# Patient Record
Sex: MALE | Race: WHITE | NOT HISPANIC OR LATINO | Employment: FULL TIME | URBAN - METROPOLITAN AREA
[De-identification: names, ages, dates, MRNs, and addresses within clinical notes are randomized per-mention and may not be internally consistent; named-entity substitution may affect disease eponyms.]

---

## 2017-07-10 ENCOUNTER — APPOINTMENT (OUTPATIENT)
Dept: URGENT CARE | Age: 47
End: 2017-07-10
Payer: OTHER MISCELLANEOUS

## 2017-07-10 ENCOUNTER — APPOINTMENT (OUTPATIENT)
Dept: RADIOLOGY | Age: 47
End: 2017-07-10
Payer: OTHER MISCELLANEOUS

## 2017-07-10 ENCOUNTER — TRANSCRIBE ORDERS (OUTPATIENT)
Dept: URGENT CARE | Age: 47
End: 2017-07-10

## 2017-07-10 DIAGNOSIS — T14.90XA INJURY: Primary | ICD-10-CM

## 2017-07-10 DIAGNOSIS — T14.90XA INJURY: ICD-10-CM

## 2017-07-10 PROCEDURE — 99284 EMERGENCY DEPT VISIT MOD MDM: CPT | Performed by: PREVENTIVE MEDICINE

## 2017-07-10 PROCEDURE — G0383 LEV 4 HOSP TYPE B ED VISIT: HCPCS | Performed by: PREVENTIVE MEDICINE

## 2017-07-10 PROCEDURE — 73030 X-RAY EXAM OF SHOULDER: CPT

## 2017-07-31 ENCOUNTER — APPOINTMENT (OUTPATIENT)
Dept: URGENT CARE | Age: 47
End: 2017-07-31
Payer: OTHER MISCELLANEOUS

## 2017-07-31 PROCEDURE — 99213 OFFICE O/P EST LOW 20 MIN: CPT | Performed by: PREVENTIVE MEDICINE

## 2018-04-18 NOTE — PRE-PROCEDURE INSTRUCTIONS
Pre-Surgery Instructions:   Medication Instructions    aspirin 81 MG tablet Instructed patient per Anesthesia Guidelines   escitalopram (LEXAPRO) 10 mg tablet Instructed patient per Anesthesia Guidelines   olmesartan (BENICAR) 40 mg tablet Instructed patient per Anesthesia Guidelines   pioglitazone (ACTOS) 15 mg tablet Instructed patient per Anesthesia Guidelines   rosuvastatin (CRESTOR) 10 MG tablet Instructed patient per Anesthesia Guidelines   sitaGLIPtin-metFORMIN (JANUMET)  MG per tablet Instructed patient per Anesthesia Guidelines     Pre op instructions reviewed; verbalized understanding

## 2018-04-25 ENCOUNTER — ANESTHESIA EVENT (OUTPATIENT)
Dept: PERIOP | Facility: HOSPITAL | Age: 48
End: 2018-04-25
Payer: COMMERCIAL

## 2018-04-25 ENCOUNTER — HOSPITAL ENCOUNTER (OUTPATIENT)
Facility: HOSPITAL | Age: 48
Setting detail: OUTPATIENT SURGERY
Discharge: HOME | End: 2018-04-25
Attending: COLON & RECTAL SURGERY | Admitting: COLON & RECTAL SURGERY
Payer: COMMERCIAL

## 2018-04-25 ENCOUNTER — ANESTHESIA (OUTPATIENT)
Dept: PERIOP | Facility: HOSPITAL | Age: 48
End: 2018-04-25
Payer: COMMERCIAL

## 2018-04-25 VITALS
BODY MASS INDEX: 36.32 KG/M2 | WEIGHT: 283 LBS | DIASTOLIC BLOOD PRESSURE: 70 MMHG | SYSTOLIC BLOOD PRESSURE: 135 MMHG | HEART RATE: 96 BPM | RESPIRATION RATE: 16 BRPM | HEIGHT: 74 IN | TEMPERATURE: 99.6 F | OXYGEN SATURATION: 96 %

## 2018-04-25 DIAGNOSIS — L05.01 PILONIDAL CYST WITH ABSCESS: Primary | ICD-10-CM

## 2018-04-25 LAB
GLUCOSE SERPL-MCNC: 161 MG/DL (ref 65–140)
GLUCOSE SERPL-MCNC: 162 MG/DL (ref 65–140)

## 2018-04-25 PROCEDURE — 82948 REAGENT STRIP/BLOOD GLUCOSE: CPT

## 2018-04-25 RX ORDER — ONDANSETRON 2 MG/ML
4 INJECTION INTRAMUSCULAR; INTRAVENOUS ONCE AS NEEDED
Status: DISCONTINUED | OUTPATIENT
Start: 2018-04-25 | End: 2018-04-25 | Stop reason: HOSPADM

## 2018-04-25 RX ORDER — ALBUTEROL SULFATE 2.5 MG/3ML
2.5 SOLUTION RESPIRATORY (INHALATION) ONCE AS NEEDED
Status: DISCONTINUED | OUTPATIENT
Start: 2018-04-25 | End: 2018-04-25 | Stop reason: HOSPADM

## 2018-04-25 RX ORDER — FENTANYL CITRATE/PF 50 MCG/ML
50 SYRINGE (ML) INJECTION
Status: DISCONTINUED | OUTPATIENT
Start: 2018-04-25 | End: 2018-04-25 | Stop reason: HOSPADM

## 2018-04-25 RX ORDER — METOCLOPRAMIDE HYDROCHLORIDE 5 MG/ML
10 INJECTION INTRAMUSCULAR; INTRAVENOUS ONCE AS NEEDED
Status: DISCONTINUED | OUTPATIENT
Start: 2018-04-25 | End: 2018-04-25 | Stop reason: HOSPADM

## 2018-04-25 RX ORDER — OXYCODONE HYDROCHLORIDE AND ACETAMINOPHEN 5; 325 MG/1; MG/1
1 TABLET ORAL EVERY 4 HOURS PRN
Qty: 30 TABLET | Refills: 0 | Status: SHIPPED | OUTPATIENT
Start: 2018-04-25 | End: 2018-05-05

## 2018-04-25 RX ORDER — SUCCINYLCHOLINE CHLORIDE 20 MG/ML
INJECTION INTRAMUSCULAR; INTRAVENOUS AS NEEDED
Status: DISCONTINUED | OUTPATIENT
Start: 2018-04-25 | End: 2018-04-25 | Stop reason: SURG

## 2018-04-25 RX ORDER — FENTANYL CITRATE 50 UG/ML
INJECTION, SOLUTION INTRAMUSCULAR; INTRAVENOUS AS NEEDED
Status: DISCONTINUED | OUTPATIENT
Start: 2018-04-25 | End: 2018-04-25 | Stop reason: SURG

## 2018-04-25 RX ORDER — SODIUM CHLORIDE, SODIUM LACTATE, POTASSIUM CHLORIDE, CALCIUM CHLORIDE 600; 310; 30; 20 MG/100ML; MG/100ML; MG/100ML; MG/100ML
125 INJECTION, SOLUTION INTRAVENOUS CONTINUOUS
Status: DISCONTINUED | OUTPATIENT
Start: 2018-04-25 | End: 2018-04-25 | Stop reason: HOSPADM

## 2018-04-25 RX ORDER — KETAMINE HYDROCHLORIDE 50 MG/ML
INJECTION, SOLUTION, CONCENTRATE INTRAMUSCULAR; INTRAVENOUS AS NEEDED
Status: DISCONTINUED | OUTPATIENT
Start: 2018-04-25 | End: 2018-04-25 | Stop reason: SURG

## 2018-04-25 RX ORDER — OXYCODONE HYDROCHLORIDE AND ACETAMINOPHEN 5; 325 MG/1; MG/1
1 TABLET ORAL EVERY 4 HOURS PRN
Status: DISCONTINUED | OUTPATIENT
Start: 2018-04-25 | End: 2018-04-25 | Stop reason: HOSPADM

## 2018-04-25 RX ORDER — SODIUM CHLORIDE, SODIUM LACTATE, POTASSIUM CHLORIDE, CALCIUM CHLORIDE 600; 310; 30; 20 MG/100ML; MG/100ML; MG/100ML; MG/100ML
50 INJECTION, SOLUTION INTRAVENOUS CONTINUOUS
Status: DISCONTINUED | OUTPATIENT
Start: 2018-04-25 | End: 2018-04-25 | Stop reason: HOSPADM

## 2018-04-25 RX ORDER — BUPIVACAINE HYDROCHLORIDE AND EPINEPHRINE 2.5; 5 MG/ML; UG/ML
INJECTION, SOLUTION EPIDURAL; INFILTRATION; INTRACAUDAL; PERINEURAL AS NEEDED
Status: DISCONTINUED | OUTPATIENT
Start: 2018-04-25 | End: 2018-04-25 | Stop reason: HOSPADM

## 2018-04-25 RX ORDER — GLYCOPYRROLATE 0.2 MG/ML
INJECTION INTRAMUSCULAR; INTRAVENOUS AS NEEDED
Status: DISCONTINUED | OUTPATIENT
Start: 2018-04-25 | End: 2018-04-25 | Stop reason: SURG

## 2018-04-25 RX ORDER — PROMETHAZINE HYDROCHLORIDE 25 MG/ML
12.5 INJECTION, SOLUTION INTRAMUSCULAR; INTRAVENOUS ONCE AS NEEDED
Status: DISCONTINUED | OUTPATIENT
Start: 2018-04-25 | End: 2018-04-25 | Stop reason: HOSPADM

## 2018-04-25 RX ORDER — ONDANSETRON 2 MG/ML
INJECTION INTRAMUSCULAR; INTRAVENOUS AS NEEDED
Status: DISCONTINUED | OUTPATIENT
Start: 2018-04-25 | End: 2018-04-25 | Stop reason: SURG

## 2018-04-25 RX ORDER — PROPOFOL 10 MG/ML
INJECTION, EMULSION INTRAVENOUS AS NEEDED
Status: DISCONTINUED | OUTPATIENT
Start: 2018-04-25 | End: 2018-04-25 | Stop reason: SURG

## 2018-04-25 RX ORDER — LIDOCAINE HYDROCHLORIDE 10 MG/ML
INJECTION, SOLUTION INFILTRATION; PERINEURAL AS NEEDED
Status: DISCONTINUED | OUTPATIENT
Start: 2018-04-25 | End: 2018-04-25 | Stop reason: SURG

## 2018-04-25 RX ORDER — ROCURONIUM BROMIDE 10 MG/ML
INJECTION, SOLUTION INTRAVENOUS AS NEEDED
Status: DISCONTINUED | OUTPATIENT
Start: 2018-04-25 | End: 2018-04-25 | Stop reason: SURG

## 2018-04-25 RX ORDER — MIDAZOLAM HYDROCHLORIDE 1 MG/ML
INJECTION INTRAMUSCULAR; INTRAVENOUS AS NEEDED
Status: DISCONTINUED | OUTPATIENT
Start: 2018-04-25 | End: 2018-04-25 | Stop reason: SURG

## 2018-04-25 RX ADMIN — KETAMINE HYDROCHLORIDE 50 MG: 50 INJECTION, SOLUTION INTRAMUSCULAR; INTRAVENOUS at 10:25

## 2018-04-25 RX ADMIN — MIDAZOLAM HYDROCHLORIDE 2 MG: 1 INJECTION, SOLUTION INTRAMUSCULAR; INTRAVENOUS at 10:03

## 2018-04-25 RX ADMIN — ONDANSETRON 4 MG: 2 INJECTION INTRAMUSCULAR; INTRAVENOUS at 10:27

## 2018-04-25 RX ADMIN — NEOSTIGMINE METHYLSULFATE 3 MG: 1 INJECTION, SOLUTION INTRAMUSCULAR; INTRAVENOUS; SUBCUTANEOUS at 10:45

## 2018-04-25 RX ADMIN — PROPOFOL 200 MG: 10 INJECTION, EMULSION INTRAVENOUS at 10:11

## 2018-04-25 RX ADMIN — SUCCINYLCHOLINE CHLORIDE 100 MG: 20 INJECTION, SOLUTION INTRAMUSCULAR; INTRAVENOUS at 10:11

## 2018-04-25 RX ADMIN — FENTANYL CITRATE 50 MCG: 50 INJECTION, SOLUTION INTRAMUSCULAR; INTRAVENOUS at 11:09

## 2018-04-25 RX ADMIN — OXYCODONE HYDROCHLORIDE AND ACETAMINOPHEN 1 TABLET: 5; 325 TABLET ORAL at 12:02

## 2018-04-25 RX ADMIN — ROCURONIUM BROMIDE 30 MG: 10 INJECTION INTRAVENOUS at 10:18

## 2018-04-25 RX ADMIN — SODIUM CHLORIDE, SODIUM LACTATE, POTASSIUM CHLORIDE, AND CALCIUM CHLORIDE 125 ML/HR: .6; .31; .03; .02 INJECTION, SOLUTION INTRAVENOUS at 09:23

## 2018-04-25 RX ADMIN — FENTANYL CITRATE 50 MCG: 50 INJECTION, SOLUTION INTRAMUSCULAR; INTRAVENOUS at 11:20

## 2018-04-25 RX ADMIN — FENTANYL CITRATE 100 MCG: 50 INJECTION, SOLUTION INTRAMUSCULAR; INTRAVENOUS at 10:07

## 2018-04-25 RX ADMIN — LIDOCAINE HYDROCHLORIDE 50 MG: 10 INJECTION, SOLUTION INFILTRATION; PERINEURAL at 10:11

## 2018-04-25 RX ADMIN — DEXAMETHASONE SODIUM PHOSPHATE 5 MG: 10 INJECTION INTRAMUSCULAR; INTRAVENOUS at 10:27

## 2018-04-25 RX ADMIN — GLYCOPYRROLATE 0.4 MG: 0.2 INJECTION, SOLUTION INTRAMUSCULAR; INTRAVENOUS at 10:45

## 2018-04-25 NOTE — ANESTHESIA POSTPROCEDURE EVALUATION
Post-Op Assessment Note      CV Status:  Stable    Mental Status:  Alert and awake    Hydration Status:  Euvolemic    PONV Controlled:  Controlled    Airway Patency:  Patent    Post Op Vitals Reviewed: Yes          Staff: CRNA, Anesthesiologist           BP   188/84   Temp   98 7   Pulse  97   Resp   20   SpO2   96

## 2018-04-25 NOTE — OP NOTE
OPERATIVE REPORT  PATIENT NAME: Dunia Alvarez    :  1970  MRN: [id number]  Pt Location: [facility] OR ROOM 15    SURGERY DATE: 2018    Surgeon(s) and Role:     * Lissy Yañez MD - Primary    Preop Diagnosis:  Pilonidal cyst with abscess [L05 01]    Post-Op Diagnosis Codes:     * Pilonidal cyst with abscess [L05 01]    Procedure(s) (LRB):  UNROOFING PILONIDAL DISEASE (N/A)    Specimen(s):  * No specimens in log *    Estimated Blood Loss:   Minimal    Drains:   None    Anesthesia Type:   General    Operative Indications:  Pilonidal cyst with abscess [L05 01]      Operative Findings:      Complications:   None    Procedure and Technique:  After preop identification the preoperative holding area, the patient was taken to the operating room placed in the supine position  Following induction of satisfactory general endotracheal anesthesia, the patient was placed in the prone position  Buttocks were taped apart  Patient was prepped and draped in usual sterile fashion  Time-out was undertaken procedure begun  On the left side of the midline patient's previously drained abscess was noted  Lacrimal duct probe was inserted through this  This was noted tract minimally towards the anus with more towards the lower back  This tract was opened along its length  This was attached to 3 midline pits  These were opened along their entire length  The area was then inspected  All inflamed tissue was excised  There was no connection of this cavity to the anorectum  Hemostasis was then ensured with Bovie cautery  Edges were then marsupialized using 3 0 chromic  Local field block was then obtained with 30 cc of 0 25% bupivacaine with epinephrine  Dry sterile dressing was applied      I was present for the entire procedure    Patient Disposition:  PACU     SIGNATURE: Lissy Yañez MD  DATE: 2018  TIME: 10:51 AM

## 2018-04-25 NOTE — ANESTHESIA PREPROCEDURE EVALUATION
Review of Systems/Medical History  Patient summary reviewed  Chart reviewed  No history of anesthetic complications     Cardiovascular  EKG reviewed, Hypertension ,   Comment: Normal sinus rhythm  Normal ECG  When compared with ECG of 17-MAY-2008 23:02,  No significant change was found  Confirmed by [facility] MD, Shaw Tran ([id number]) on 5/14/2015 8:20:58 AM    Specimen Collected: 05/14/15 07:19  Last Resulted: 05/14/15 08:18      ,  Pulmonary       GI/Hepatic            Endo/Other  Diabetes poorly controlled type 2 ,      GYN       Hematology   Musculoskeletal       Neurology   Psychology           Physical Exam    Airway    Mallampati score: II  TM Distance: >3 FB  Neck ROM: full     Dental   No notable dental hx     Cardiovascular  Cardiovascular exam normal    Pulmonary  Pulmonary exam normal Breath sounds clear to auscultation,     Other Findings        Anesthesia Plan  ASA Score- 2     Anesthesia Type- general with ASA Monitors  Additional Monitors:   Airway Plan: ETT  Plan Factors- Patient instructed to abstain from smoking on day of procedure       Induction- intravenous  Postoperative Plan- Plan for postoperative opioid use  Planned trial extubation    Informed Consent- Anesthetic plan and risks discussed with patient  I personally reviewed this patient with the CRNA  Discussed and agreed on the Anesthesia Plan with the CRNA           Lab Results   Component Value Date    WBC 12 49 (H) 05/14/2015    HGB 16 2 05/14/2015    HCT 46 8 05/14/2015    MCV 89 05/14/2015     05/14/2015     Lab Results   Component Value Date    GLUCOSE 276 (H) 05/14/2015    CALCIUM 9 1 05/14/2015     (L) 05/14/2015    K 4 3 05/14/2015    CO2 28 05/14/2015    CL 97 (L) 05/14/2015    BUN 11 05/14/2015    CREATININE 0 79 05/14/2015     Lab Results   Component Value Date    INR 0 88 05/14/2015    PROTIME 11 8 05/14/2015     Lab Results   Component Value Date    PTT 31 05/14/2015           I, Dr Alivia Barney, the attending physician, have personally seen and evaluated the patient prior to anesthetic care  I have reviewed the pre-anesthetic record, and other medical records if appropriate to the anesthetic care  If a CRNA is involved in the case, I have reviewed the CRNA assessment, if present, and agree  The patient is in a suitable condition to proceed with my formulated anesthetic plan

## 2018-04-25 NOTE — DISCHARGE INSTRUCTIONS
May shower or tub bathe beginning today  Keep wound covered with gauze and change as needed  Ensure you are not getting constipated using Metamucil 1 tablespoon 1-2 times daily with plenty of hydration  Pain medication as prescribed for pain not to be combined with Tylenol  May use ibuprofen as well     Call for worsening pain, heavy bleeding, inability to urinate or fever greater than 101 5  2 weeks follow-up Dr [facility] [phone]

## 2018-04-25 NOTE — ANESTHESIA POSTPROCEDURE EVALUATION
Post-Op Assessment Note      CV Status:  Stable    Mental Status:  Alert and awake    Hydration Status:  Euvolemic    PONV Controlled:  Controlled    Airway Patency:  Patent    Post Op Vitals Reviewed: Yes          Staff: Anesthesiologist, CRNA           BP   166/84   Temp   98 7   Pulse  103   Resp   20   SpO2   97%

## 2019-04-03 ENCOUNTER — HOSPITAL ENCOUNTER (EMERGENCY)
Facility: HOSPITAL | Age: 49
Discharge: HOME | End: 2019-04-03
Attending: EMERGENCY MEDICINE | Admitting: EMERGENCY MEDICINE
Payer: COMMERCIAL

## 2019-04-03 ENCOUNTER — APPOINTMENT (EMERGENCY)
Dept: RADIOLOGY | Facility: HOSPITAL | Age: 49
End: 2019-04-03
Payer: COMMERCIAL

## 2019-04-03 VITALS
HEART RATE: 86 BPM | WEIGHT: 280 LBS | TEMPERATURE: 97.9 F | RESPIRATION RATE: 16 BRPM | HEIGHT: 74 IN | SYSTOLIC BLOOD PRESSURE: 167 MMHG | DIASTOLIC BLOOD PRESSURE: 85 MMHG | OXYGEN SATURATION: 98 % | BODY MASS INDEX: 35.94 KG/M2
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
## 2019-04-03 PROCEDURE — 73030 X-RAY EXAM OF SHOULDER: CPT

## 2019-04-03 PROCEDURE — 73560 X-RAY EXAM OF KNEE 1 OR 2: CPT

## 2019-04-03 PROCEDURE — 99284 EMERGENCY DEPT VISIT MOD MDM: CPT

## 2019-04-03 PROCEDURE — 96372 THER/PROPH/DIAG INJ SC/IM: CPT

## 2019-04-03 PROCEDURE — 99283 EMERGENCY DEPT VISIT LOW MDM: CPT | Performed by: EMERGENCY MEDICINE

## 2019-04-03 RX ORDER — KETOROLAC TROMETHAMINE 30 MG/ML
15 INJECTION, SOLUTION INTRAMUSCULAR; INTRAVENOUS ONCE
Status: DISCONTINUED | OUTPATIENT
Start: 2019-04-03 | End: 2019-04-03

## 2019-04-03 RX ORDER — ACETAMINOPHEN 325 MG/1
975 TABLET ORAL ONCE
Status: COMPLETED | OUTPATIENT
Start: 2019-04-03 | End: 2019-04-03

## 2019-04-03 RX ORDER — KETOROLAC TROMETHAMINE 30 MG/ML
15 INJECTION, SOLUTION INTRAMUSCULAR; INTRAVENOUS ONCE
Status: COMPLETED | OUTPATIENT
Start: 2019-04-03 | End: 2019-04-03

## 2019-04-03 RX ADMIN — ACETAMINOPHEN 975 MG: 325 TABLET ORAL at 06:36

## 2019-04-03 RX ADMIN — KETOROLAC TROMETHAMINE 15 MG: 30 INJECTION, SOLUTION INTRAMUSCULAR; INTRAVENOUS at 06:58

## 2019-10-03 ENCOUNTER — HOSPITAL ENCOUNTER (EMERGENCY)
Facility: HOSPITAL | Age: 49
Discharge: HOME | End: 2019-10-03
Attending: EMERGENCY MEDICINE | Admitting: EMERGENCY MEDICINE
Payer: COMMERCIAL

## 2019-10-03 ENCOUNTER — APPOINTMENT (EMERGENCY)
Dept: RADIOLOGY | Facility: HOSPITAL | Age: 49
End: 2019-10-03
Payer: COMMERCIAL

## 2019-10-03 VITALS
SYSTOLIC BLOOD PRESSURE: 175 MMHG | HEIGHT: 74 IN | OXYGEN SATURATION: 98 % | WEIGHT: 279.98 LBS | RESPIRATION RATE: 18 BRPM | TEMPERATURE: 98.6 F | DIASTOLIC BLOOD PRESSURE: 93 MMHG | BODY MASS INDEX: 35.93 KG/M2 | HEART RATE: 104 BPM

## 2019-10-03 DIAGNOSIS — L03.116 CELLULITIS OF LEFT FOOT: ICD-10-CM

## 2019-10-03 DIAGNOSIS — L97.529 ULCER OF LEFT GREAT TOE DUE TO DIABETES MELLITUS (HCC): Primary | ICD-10-CM

## 2019-10-03 DIAGNOSIS — E11.621 ULCER OF LEFT GREAT TOE DUE TO DIABETES MELLITUS (HCC): Primary | ICD-10-CM

## 2019-10-03 PROCEDURE — 73660 X-RAY EXAM OF TOE(S): CPT

## 2019-10-03 PROCEDURE — 99285 EMERGENCY DEPT VISIT HI MDM: CPT | Performed by: EMERGENCY MEDICINE

## 2019-10-03 PROCEDURE — 87186 SC STD MICRODIL/AGAR DIL: CPT | Performed by: EMERGENCY MEDICINE

## 2019-10-03 PROCEDURE — 87205 SMEAR GRAM STAIN: CPT | Performed by: EMERGENCY MEDICINE

## 2019-10-03 PROCEDURE — 99283 EMERGENCY DEPT VISIT LOW MDM: CPT

## 2019-10-03 PROCEDURE — 87070 CULTURE OTHR SPECIMN AEROBIC: CPT | Performed by: EMERGENCY MEDICINE

## 2019-10-03 PROCEDURE — 87077 CULTURE AEROBIC IDENTIFY: CPT | Performed by: EMERGENCY MEDICINE

## 2019-10-03 RX ORDER — CEPHALEXIN 250 MG/1
500 CAPSULE ORAL ONCE
Status: COMPLETED | OUTPATIENT
Start: 2019-10-03 | End: 2019-10-03

## 2019-10-03 RX ORDER — FENOFIBRATE 145 MG/1
145 TABLET, COATED ORAL DAILY
COMMUNITY

## 2019-10-03 RX ORDER — CEPHALEXIN 500 MG/1
500 CAPSULE ORAL EVERY 6 HOURS SCHEDULED
Qty: 28 CAPSULE | Refills: 0 | Status: SHIPPED | OUTPATIENT
Start: 2019-10-03 | End: 2019-10-10

## 2019-10-03 RX ORDER — METRONIDAZOLE 500 MG/1
500 TABLET ORAL ONCE
Status: COMPLETED | OUTPATIENT
Start: 2019-10-03 | End: 2019-10-03

## 2019-10-03 RX ORDER — METRONIDAZOLE 500 MG/1
500 TABLET ORAL EVERY 8 HOURS SCHEDULED
Qty: 21 TABLET | Refills: 0 | Status: SHIPPED | OUTPATIENT
Start: 2019-10-03 | End: 2019-10-10

## 2019-10-03 RX ADMIN — METRONIDAZOLE 500 MG: 500 TABLET, FILM COATED ORAL at 20:31

## 2019-10-03 RX ADMIN — CEPHALEXIN 500 MG: 250 CAPSULE ORAL at 20:32

## 2019-10-04 NOTE — ED PROVIDER NOTES
History  Chief Complaint   Patient presents with    Foot Ulcer     Pt c/o ulcer on left great toe  Pt states he has been seeing a wound doctor but next appointment isnt until the 11th of this month  pt  c/o increased swelling and pain  HPI   61-year-old gentleman presents to the emergency department for an ulcer on his left hallux  Patient has diabetes and has had a long-standing ulcer on this toe for about 6 months  Patient has been having this managed by his PCP  PCP recently referred him to the wound care clinic given delayed healing  He was given precautions to present to the emergency department if the ulcer developed any foul smell or worsening drainage  Patient says that over the last couple days the ulcer has actually shrunk in size but smelled unusual when he took off his shoes today  He has noticed a small amount of discharge from the bed of the ulcer  He also has swelling of his left great toe and redness over the dorsal aspect of the foot extending from the toe proximally  He has otherwise felt well  No fever, nausea, vomiting, or abdominal pain  He is not currently on any antibiotics for the ulcer  He has not yet been able to see wound care/podiatry for the ulcer  He says his diabetes is well controlled on current p o  meds  Prior to Admission Medications   Prescriptions Last Dose Informant Patient Reported? Taking?   aspirin 81 MG tablet   Yes No   Sig: Take 81 mg by mouth daily  escitalopram (LEXAPRO) 10 mg tablet 10/3/2019 at Unknown time  Yes Yes   Sig: Take 10 mg by mouth daily  fenofibrate (TRICOR) 145 mg tablet 10/2/2019 at Unknown time  Yes Yes   Sig: Take 145 mg by mouth daily   olmesartan (BENICAR) 40 mg tablet 10/3/2019 at Unknown time  Yes Yes   Sig: Take 40 mg by mouth daily     pioglitazone (ACTOS) 15 mg tablet 10/3/2019 at Unknown time  Yes Yes   Sig: Take 15 mg by mouth daily at bedtime     rosuvastatin (CRESTOR) 10 MG tablet 10/3/2019 at Unknown time  Yes Yes Sig: Take 10 mg by mouth daily  sitaGLIPtin-metFORMIN (JANUMET)  MG per tablet 10/3/2019 at Unknown time  Yes Yes   Sig: Take 1 tablet by mouth 2 (two) times a day with meals  Facility-Administered Medications: None       Past Medical History:   Diagnosis Date    Diabetes mellitus ([facility] [address] )     Hypertension        Past Surgical History:   Procedure Laterality Date    KNEE ARTHROSCOPY      right knee    LA REMV PILONIDAL LESION EXTENS N/A 4/25/2018    Procedure: UNROOFING PILONIDAL DISEASE;  Surgeon: Amilcar Schroeder MD;  Location: [facility];  Service: Colorectal       History reviewed  No pertinent family history  I have reviewed and agree with the history as documented  Social History     Tobacco Use    Smoking status: Current Every Day Smoker     Packs/day: 1 00    Smokeless tobacco: Never Used   Substance Use Topics    Alcohol use: No    Drug use: No        Review of Systems   Constitutional: Negative for chills and fever  Respiratory: Negative for shortness of breath  Cardiovascular: Negative for chest pain  Gastrointestinal: Negative for abdominal pain, nausea and vomiting  Skin: Positive for color change and wound  All other systems reviewed and are negative  Physical Exam  ED Triage Vitals [10/03/19 1935]   Temperature Pulse Respirations Blood Pressure SpO2   98 6 °F (37 °C) 104 18 (!) 175/93 98 %      Temp Source Heart Rate Source Patient Position - Orthostatic VS BP Location FiO2 (%)   Oral Monitor Lying Right arm --      Pain Score       1             Orthostatic Vital Signs  Vitals:    10/03/19 1935   BP: (!) 175/93   Pulse: 104   Patient Position - Orthostatic VS: Lying       Physical Exam   Constitutional: He is oriented to person, place, and time  He appears well-developed and well-nourished  No distress  HENT:   Head: Normocephalic and atraumatic  Eyes: Pupils are equal, round, and reactive to light  Conjunctivae and EOM are normal  No scleral icterus  Neck: Normal range of motion  Neck supple  Cardiovascular: Normal rate and regular rhythm  Exam reveals no gallop and no friction rub  No murmur heard  Pulmonary/Chest: Breath sounds normal  He has no wheezes  He has no rales  Abdominal: Soft  He exhibits no distension  There is no tenderness  There is no rebound and no guarding  Musculoskeletal: Normal range of motion  He exhibits no edema or tenderness  Lymphadenopathy:     He has no cervical adenopathy  Neurological: He is alert and oriented to person, place, and time  No cranial nerve deficit or sensory deficit  He exhibits normal muscle tone  Left foot is neurovascularly intact  No sensory deficits other than mild reduction in sensation to light touch over the medial aspect of the left great toe  Skin: Skin is warm and dry  He is not diaphoretic  No erythema  No pallor  Circular punched out lesion over the plantar aspect of the left hallux  Does not probe to bone  Small amount of discharge noted in ulcer bed  There is redness and swelling over the dorsal surface of the left hallux extending proximally up the dorsal surface of the foot  Psychiatric: He has a normal mood and affect  His behavior is normal    Nursing note and vitals reviewed  ED Medications  Medications   cephalexin (KEFLEX) capsule 500 mg (500 mg Oral Given 10/3/19 2032)   metroNIDAZOLE (FLAGYL) tablet 500 mg (500 mg Oral Given 10/3/19 2031)       Diagnostic Studies  Results Reviewed     Procedure Component Value Units Date/Time    Wound culture and Gram stain [[id number]] Collected:  10/03/19 2034    Lab Status:  Preliminary result Specimen:  Wound from Foot, Left Updated:  10/04/19 1258     Wound Culture Culture too young- will reincubate     Gram Stain Result No Polys or Bacteria seen                 XR toe great min 2 views LEFT   ED Interpretation by Marilynn Pettit MD (10/03 2056)   ED wet read:  No osseous involvement seen        Final Result by Latonya Lui MD (10/04 9328)      Plantar soft tissue ulcer distal 1st phalanx with suspected underlying tuft osteomyelitis  The study was marked in [facility] for immediate notification  Workstation performed: [id number]               Procedures  Procedures        ED Course           MDM   53-year-old presenting for worsening of chronic ulcer on his left great toe  Patient has no constitutional symptoms concerning for systemic infection  He does have what appears to be cellulitis surrounding the ulcer  Will treat with course of oral antibiotics  Referral to podiatry clinic provided, although patient already has an appointment scheduled to see wound care in less than one week  Return precautions for any spreading erythema or development of constitutional symptoms of infection  Note of formal radiology read day following patient's ED visit with evidence of possible osteomyelitis of distal phalanx  KATIE Cruz contacting patient regarding recommendation of return to ED for expedited evaluation by podiatry  Disposition  Final diagnoses:   Ulcer of left great toe due to diabetes mellitus ([address] )   Cellulitis of left foot     Time reflects when diagnosis was documented in both MDM as applicable and the Disposition within this note     Time User Action Codes Description Comment    10/3/2019  8:57 PM Donald King [[id number],  L97 [id number]] Ulcer of left great toe due to diabetes mellitus ([address] )     10/3/2019  8:57 PM Josh Uriarte [L03 [id number]] Cellulitis of left foot       ED Disposition     ED Disposition Condition Date/Time Comment    Discharge Good u Oct 3, 2019  8:57 PM Fiona 27 discharge to home/self care              Follow-up Information     Follow up With Specialties Details Why Contact Info Additional Information    Caio Candelario, DO Family Medicine  As needed [address] [place] [address] [id number] [phone]       [facility] Schedule an appointment as soon as possible for a visit   [address] [place] [phone]  [address], [address] [place] [address] [place], [place]   Juankrisheve Carbajal          Discharge Medication List as of 10/3/2019  9:07 PM      START taking these medications    Details   cephalexin (KEFLEX) 500 mg capsule Take 1 capsule (500 mg total) by mouth every 6 (six) hours for 7 days, Starting u 10/3/2019, Until u 10/10/2019, Print      metroNIDAZOLE (FLAGYL) 500 mg tablet Take 1 tablet (500 mg total) by mouth every 8 (eight) hours for 7 days, Starting u 10/3/2019, Until Thu 10/10/2019, Print         CONTINUE these medications which have NOT CHANGED    Details   escitalopram (LEXAPRO) 10 mg tablet Take 10 mg by mouth daily  , Until Discontinued, Historical Med      fenofibrate (TRICOR) 145 mg tablet Take 145 mg by mouth daily, Historical Med      olmesartan (BENICAR) 40 mg tablet Take 40 mg by mouth daily  , Until Discontinued, Historical Med      pioglitazone (ACTOS) 15 mg tablet Take 15 mg by mouth daily at bedtime  , Historical Med      rosuvastatin (CRESTOR) 10 MG tablet Take 10 mg by mouth daily  , Until Discontinued, Historical Med      sitaGLIPtin-metFORMIN (JANUMET)  MG per tablet Take 1 tablet by mouth 2 (two) times a day with meals  , Until Discontinued, Historical Med      aspirin 81 MG tablet Take 81 mg by mouth daily  , Until Discontinued, Historical Med           No discharge procedures on file  ED Provider  Attending physically available and evaluated Guadalupe Patricia I managed the patient along with the ED Attending      Electronically Signed by         Alberta Gatica MD  10/04/19 0597

## 2019-10-04 NOTE — ED ATTENDING ATTESTATION
10/3/2019  Glynn Michel DO, saw and evaluated the patient  I have discussed the patient with the resident/non-physician practitioner and agree with the resident's/non-physician practitioner's findings, Plan of Care, and MDM as documented in the resident's/non-physician practitioner's note, except where noted  All available labs and Radiology studies were reviewed  I was present for key portions of any procedure(s) performed by the resident/non-physician practitioner and I was immediately available to provide assistance  At this point I agree with the current assessment done in the Emergency Department  I have conducted an independent evaluation of this patient a history and physical is as follows:    53 yo male presents for evaluation of L great toe ulcer which has been there for many months  PMD has been managing it, recently referred to wound care clinic as it was felt to be getting worse  Pt presents today because he took off his work boot and felt it was malodorous  He states his wound seems to have some more discharge  Pain 1/10  Hx of DM    Toe erythematous, malodorous, has small discharge  Unable to probe to bone  Imp: chronic L hallux ulcer  Likely superficial infection  Plan: films, abx        ED Course         Critical Care Time  Procedures

## 2019-10-04 NOTE — RESULT ENCOUNTER NOTE
I called patient regarding the possible osteomyelitis he is a diabetic he has an appointment with wound care in more than 2 weeks  He does not have any systemic symptoms but due to fact that he is a diabetic I will have him return here for possible Podiatry consultation he was given antibiotics with outpatient but did not fill them yet    I think to stay in the states ideation return for further evaluation

## 2019-10-06 LAB
BACTERIA WND AEROBE CULT: ABNORMAL
GRAM STN SPEC: ABNORMAL

## 2019-10-10 ENCOUNTER — TRANSCRIBE ORDERS (OUTPATIENT)
Dept: ADMINISTRATIVE | Age: 49
End: 2019-10-10

## 2019-10-10 ENCOUNTER — APPOINTMENT (OUTPATIENT)
Dept: LAB | Age: 49
End: 2019-10-10
Payer: COMMERCIAL

## 2019-10-10 DIAGNOSIS — L97.509 TYPE 2 DIABETES MELLITUS WITH FOOT ULCER, UNSPECIFIED WHETHER LONG TERM INSULIN USE (HCC): Primary | ICD-10-CM

## 2019-10-10 DIAGNOSIS — E11.621 TYPE 2 DIABETES MELLITUS WITH FOOT ULCER, UNSPECIFIED WHETHER LONG TERM INSULIN USE (HCC): ICD-10-CM

## 2019-10-10 DIAGNOSIS — E11.621 TYPE 2 DIABETES MELLITUS WITH FOOT ULCER, UNSPECIFIED WHETHER LONG TERM INSULIN USE (HCC): Primary | ICD-10-CM

## 2019-10-10 DIAGNOSIS — L97.509 TYPE 2 DIABETES MELLITUS WITH FOOT ULCER, UNSPECIFIED WHETHER LONG TERM INSULIN USE (HCC): ICD-10-CM

## 2019-10-10 LAB
ANION GAP SERPL CALCULATED.3IONS-SCNC: 6 MMOL/L (ref 4–13)
BASOPHILS # BLD AUTO: 0.05 THOUSANDS/ΜL (ref 0–0.1)
BASOPHILS NFR BLD AUTO: 1 % (ref 0–1)
BUN SERPL-MCNC: 14 MG/DL (ref 5–25)
CALCIUM SERPL-MCNC: 9.6 MG/DL (ref 8.3–10.1)
CHLORIDE SERPL-SCNC: 103 MMOL/L (ref 100–108)
CO2 SERPL-SCNC: 30 MMOL/L (ref 21–32)
CREAT SERPL-MCNC: 1.04 MG/DL (ref 0.6–1.3)
EOSINOPHIL # BLD AUTO: 0.06 THOUSAND/ΜL (ref 0–0.61)
EOSINOPHIL NFR BLD AUTO: 1 % (ref 0–6)
ERYTHROCYTE [DISTWIDTH] IN BLOOD BY AUTOMATED COUNT: 12.1 % (ref 11.6–15.1)
ERYTHROCYTE [SEDIMENTATION RATE] IN BLOOD: 7 MM/HOUR (ref 0–10)
GFR SERPL CREATININE-BSD FRML MDRD: 85 ML/MIN/1.73SQ M
GLUCOSE SERPL-MCNC: 167 MG/DL (ref 65–140)
HCT VFR BLD AUTO: 48.3 % (ref 36.5–49.3)
HGB BLD-MCNC: 15.7 G/DL (ref 12–17)
IMM GRANULOCYTES # BLD AUTO: 0.04 THOUSAND/UL (ref 0–0.2)
IMM GRANULOCYTES NFR BLD AUTO: 0 % (ref 0–2)
LYMPHOCYTES # BLD AUTO: 2.74 THOUSANDS/ΜL (ref 0.6–4.47)
LYMPHOCYTES NFR BLD AUTO: 25 % (ref 14–44)
MCH RBC QN AUTO: 30.4 PG (ref 26.8–34.3)
MCHC RBC AUTO-ENTMCNC: 32.5 G/DL (ref 31.4–37.4)
MCV RBC AUTO: 93 FL (ref 82–98)
MONOCYTES # BLD AUTO: 0.72 THOUSAND/ΜL (ref 0.17–1.22)
MONOCYTES NFR BLD AUTO: 7 % (ref 4–12)
NEUTROPHILS # BLD AUTO: 7.4 THOUSANDS/ΜL (ref 1.85–7.62)
NEUTS SEG NFR BLD AUTO: 66 % (ref 43–75)
NRBC BLD AUTO-RTO: 0 /100 WBCS
PLATELET # BLD AUTO: 274 THOUSANDS/UL (ref 149–390)
PMV BLD AUTO: 10.8 FL (ref 8.9–12.7)
POTASSIUM SERPL-SCNC: 4.8 MMOL/L (ref 3.5–5.3)
RBC # BLD AUTO: 5.17 MILLION/UL (ref 3.88–5.62)
SODIUM SERPL-SCNC: 139 MMOL/L (ref 136–145)
WBC # BLD AUTO: 11.01 THOUSAND/UL (ref 4.31–10.16)

## 2019-10-10 PROCEDURE — 85652 RBC SED RATE AUTOMATED: CPT

## 2019-10-10 PROCEDURE — 85025 COMPLETE CBC W/AUTO DIFF WBC: CPT

## 2019-10-10 PROCEDURE — 36415 COLL VENOUS BLD VENIPUNCTURE: CPT

## 2019-10-10 PROCEDURE — 80048 BASIC METABOLIC PNL TOTAL CA: CPT

## 2019-11-11 ENCOUNTER — TRANSCRIBE ORDERS (OUTPATIENT)
Dept: ADMINISTRATIVE | Facility: HOSPITAL | Age: 49
End: 2019-11-11

## 2019-11-11 DIAGNOSIS — E11.69 DIABETIC FOOT ULCER WITH OSTEOMYELITIS (HCC): Primary | ICD-10-CM

## 2019-11-11 DIAGNOSIS — E11.621 DIABETIC FOOT ULCER WITH OSTEOMYELITIS (HCC): Primary | ICD-10-CM

## 2019-11-11 DIAGNOSIS — M86.9 DIABETIC FOOT ULCER WITH OSTEOMYELITIS (HCC): Primary | ICD-10-CM

## 2019-11-11 DIAGNOSIS — L97.509 DIABETIC FOOT ULCER WITH OSTEOMYELITIS (HCC): Primary | ICD-10-CM

## 2020-03-19 ENCOUNTER — OFFICE VISIT (OUTPATIENT)
Dept: PODIATRY | Facility: CLINIC | Age: 50
End: 2020-03-19
Payer: COMMERCIAL

## 2020-03-19 VITALS
BODY MASS INDEX: 36.96 KG/M2 | WEIGHT: 288 LBS | DIASTOLIC BLOOD PRESSURE: 83 MMHG | SYSTOLIC BLOOD PRESSURE: 132 MMHG | HEIGHT: 74 IN | HEART RATE: 99 BPM

## 2020-03-19 DIAGNOSIS — E11.69 DIABETIC FOOT ULCER WITH OSTEOMYELITIS (HCC): ICD-10-CM

## 2020-03-19 DIAGNOSIS — E11.621 DIABETIC FOOT ULCER WITH OSTEOMYELITIS (HCC): ICD-10-CM

## 2020-03-19 DIAGNOSIS — L97.509 DIABETIC FOOT ULCER WITH OSTEOMYELITIS (HCC): ICD-10-CM

## 2020-03-19 DIAGNOSIS — M86.9 DIABETIC FOOT ULCER WITH OSTEOMYELITIS (HCC): ICD-10-CM

## 2020-03-19 DIAGNOSIS — M86.9 OSTEOMYELITIS OF GREAT TOE OF LEFT FOOT (HCC): Primary | ICD-10-CM

## 2020-03-19 PROCEDURE — 97597 DBRDMT OPN WND 1ST 20 CM/<: CPT | Performed by: PODIATRIST

## 2020-03-19 PROCEDURE — 99203 OFFICE O/P NEW LOW 30 MIN: CPT | Performed by: PODIATRIST

## 2020-03-19 RX ORDER — OLMESARTAN MEDOXOMIL AND HYDROCHLOROTHIAZIDE 40/25 40; 25 MG/1; MG/1
TABLET ORAL
COMMUNITY
Start: 2017-02-17 | End: 2021-05-12

## 2020-03-19 RX ORDER — GLIMEPIRIDE 4 MG/1
TABLET ORAL
COMMUNITY

## 2020-03-19 RX ORDER — SILVER SULFADIAZINE 1 %
CREAM (GRAM) TOPICAL
COMMUNITY
Start: 2020-03-06

## 2020-03-19 NOTE — PROGRESS NOTES
Assessment/Plan:    X-rays, three views left foot taken  There is slight erosion noticed of the distal tuft of the distal phalanx suspicious for osteomyelitis  Arthritic changes noted left 1st MPJ  Partial-thickness debridement performed revealing ulceration at the plantar aspect of the left hallux to measure 1 0 cm x 0 5 cm  Ulcer probes to dermis  No bleeding with debridement  There is minimal edema in the left great toe and no erythema and no purulence  The ulcer does not probe to bone  A hallux rigidus deformity is noted  Patient advised to minimize weight-bearing as best that he can  He will continue with Silvadene cream b i d  He will be reassessed in 2 weeks  He was told to keep taking his medication for diabetes  He was urged to refrain from walking barefoot  He has significant neuropathy  No problem-specific Assessment & Plan notes found for this encounter  Diagnoses and all orders for this visit:    Osteomyelitis of great toe of left foot ([facility] [address] )  -     X-ray foot left 3+ views; Future    Diabetic foot ulcer with osteomyelitis ([facility] [address] )    Other orders  -     olmesartan-hydrochlorothiazide (Benicar HCT) 40-25 MG per tablet; TAKE ONE TABLET BY MOUTH ONCE DAILY  -     glimepiride (AMARYL) 4 mg tablet; glimepiride 4 mg tablet   TAKE 1 TABLET BY MOUTH ONCE DAILY  -     SSD 1 % cream; APPLY CREAM EVERY DAY AS NEEDED          Subjective:      Patient ID: Mesfin Rosado is a 54 y o  male  HPI     Patient presents with concern regarding an ulceration present on the bottom surface of the left great toe  This ulcer has been present since October of 2019  At its initial presentation, the ulceration was deep and likely probe to bone  X-rays were positive for suspected osteomyelitis of the distal tuft of the distal phalanx  Patient is diabetic with diabetic neuropathy  He has had minimal pain in the left great toe  Patient notes that he has been under the care of Dr Elton Desai    He states that he was placed on oral antibiotic and also used Bactroban ointment and now Silvadene  The ulcer has become much smaller  He has no swelling in the left great toe  He admits to excessive ambulation  Patient's blood sugar has been erratic  Patient had not been taking his oral medication  He states that for the past few weeks has been doing much better as far as taking his medication  The following portions of the patient's history were reviewed and updated as appropriate: allergies, current medications, past family history, past medical history, past social history, past surgical history and problem list     Review of Systems   Constitutional:        Overweight   Cardiovascular: Negative  Gastrointestinal: Negative  Musculoskeletal: Negative  Neurological: Positive for numbness  Objective:      /83   Pulse 99   Ht 6' 1 5" (1 867 m)   Wt 131 kg (288 lb)   BMI 37 48 kg/m²          Physical Exam   Cardiovascular: Pulses are no weak pulses  Pulses:       Dorsalis pedis pulses are 1+ on the right side, and 1+ on the left side  Posterior tibial pulses are 2+ on the right side, and 2+ on the left side  Feet:   Right Foot:   Skin Integrity: Negative for ulcer, skin breakdown, erythema, warmth, callus or dry skin  Left Foot:   Skin Integrity: Positive for ulcer  Diabetic Foot Exam    Patient's shoes and socks removed  Right Foot/Ankle   Right Foot Inspection  Skin Exam: skin normal and skin intact no dry skin, no warmth, no callus, no erythema, no maceration, no abnormal color, no pre-ulcer, no ulcer and no callus                          Toe Exam: ROM and strength within normal limits  Sensory   Vibration: diminished  Proprioception: intact   Monofilament testing: diminished  Vascular  Capillary refills: < 3 seconds  The right DP pulse is 1+  The right PT pulse is 2+     Right Toe  - Comprehensive Exam  Ecchymosis: none  Arch: normal  Hammertoes: fourth toe  Swelling: none   Tenderness: none         Left Foot/Ankle  Left Foot Inspection  Skin Exam: ulcer              Ulcer Size (cm): 1           Toe Exam: left toe deformity                   Sensory   Vibration: diminished  Proprioception: intact  Monofilament: diminished  Vascular  Capillary refills: < 3 seconds  The left DP pulse is 1+  The left PT pulse is 2+  Left Toe  - Comprehensive Exam  Ecchymosis: none  Arch: normal  Hammertoes: absent  Claw toes: absent  Swelling: none   Tenderness: none       Assign Risk Category:  Deformity present; Loss of protective sensation;  No weak pulses       Risk: 2

## 2020-04-02 ENCOUNTER — OFFICE VISIT (OUTPATIENT)
Dept: PODIATRY | Facility: CLINIC | Age: 50
End: 2020-04-02
Payer: COMMERCIAL

## 2020-04-02 VITALS — WEIGHT: 290.4 LBS | HEIGHT: 74 IN | BODY MASS INDEX: 37.27 KG/M2

## 2020-04-02 DIAGNOSIS — L97.521 DIABETIC ULCER OF TOE OF LEFT FOOT ASSOCIATED WITH TYPE 2 DIABETES MELLITUS, LIMITED TO BREAKDOWN OF SKIN (HCC): Primary | ICD-10-CM

## 2020-04-02 DIAGNOSIS — E11.621 DIABETIC ULCER OF TOE OF LEFT FOOT ASSOCIATED WITH TYPE 2 DIABETES MELLITUS, LIMITED TO BREAKDOWN OF SKIN (HCC): Primary | ICD-10-CM

## 2020-04-02 PROCEDURE — 99213 OFFICE O/P EST LOW 20 MIN: CPT | Performed by: PODIATRIST

## 2020-04-30 ENCOUNTER — OFFICE VISIT (OUTPATIENT)
Dept: PODIATRY | Facility: CLINIC | Age: 50
End: 2020-04-30
Payer: COMMERCIAL

## 2020-04-30 VITALS — BODY MASS INDEX: 37.5 KG/M2 | HEIGHT: 74 IN | WEIGHT: 292.2 LBS

## 2020-04-30 DIAGNOSIS — E11.621 DIABETIC ULCER OF TOE OF LEFT FOOT ASSOCIATED WITH TYPE 2 DIABETES MELLITUS, LIMITED TO BREAKDOWN OF SKIN (HCC): Primary | ICD-10-CM

## 2020-04-30 DIAGNOSIS — L97.521 DIABETIC ULCER OF TOE OF LEFT FOOT ASSOCIATED WITH TYPE 2 DIABETES MELLITUS, LIMITED TO BREAKDOWN OF SKIN (HCC): Primary | ICD-10-CM

## 2020-04-30 PROCEDURE — 97597 DBRDMT OPN WND 1ST 20 CM/<: CPT | Performed by: PODIATRIST

## 2020-05-18 ENCOUNTER — OFFICE VISIT (OUTPATIENT)
Dept: PODIATRY | Facility: CLINIC | Age: 50
End: 2020-05-18
Payer: COMMERCIAL

## 2020-05-18 VITALS — BODY MASS INDEX: 36.7 KG/M2 | HEIGHT: 74 IN | WEIGHT: 286 LBS

## 2020-05-18 DIAGNOSIS — L97.521 DIABETIC ULCER OF TOE OF LEFT FOOT ASSOCIATED WITH TYPE 2 DIABETES MELLITUS, LIMITED TO BREAKDOWN OF SKIN (HCC): Primary | ICD-10-CM

## 2020-05-18 DIAGNOSIS — E11.621 DIABETIC ULCER OF TOE OF LEFT FOOT ASSOCIATED WITH TYPE 2 DIABETES MELLITUS, LIMITED TO BREAKDOWN OF SKIN (HCC): Primary | ICD-10-CM

## 2020-05-18 PROCEDURE — 99212 OFFICE O/P EST SF 10 MIN: CPT | Performed by: PODIATRIST

## 2020-06-18 ENCOUNTER — OFFICE VISIT (OUTPATIENT)
Dept: PODIATRY | Facility: CLINIC | Age: 50
End: 2020-06-18
Payer: COMMERCIAL

## 2020-06-18 VITALS — WEIGHT: 293 LBS | BODY MASS INDEX: 37.6 KG/M2 | HEIGHT: 74 IN

## 2020-06-18 DIAGNOSIS — L97.521 DIABETIC ULCER OF TOE OF LEFT FOOT ASSOCIATED WITH TYPE 2 DIABETES MELLITUS, LIMITED TO BREAKDOWN OF SKIN (HCC): Primary | ICD-10-CM

## 2020-06-18 DIAGNOSIS — E11.621 DIABETIC ULCER OF TOE OF LEFT FOOT ASSOCIATED WITH TYPE 2 DIABETES MELLITUS, LIMITED TO BREAKDOWN OF SKIN (HCC): Primary | ICD-10-CM

## 2020-06-18 PROCEDURE — 99212 OFFICE O/P EST SF 10 MIN: CPT | Performed by: PODIATRIST

## 2020-07-20 ENCOUNTER — OFFICE VISIT (OUTPATIENT)
Dept: PODIATRY | Facility: CLINIC | Age: 50
End: 2020-07-20
Payer: COMMERCIAL

## 2020-07-20 VITALS
WEIGHT: 297.8 LBS | SYSTOLIC BLOOD PRESSURE: 134 MMHG | DIASTOLIC BLOOD PRESSURE: 83 MMHG | HEART RATE: 101 BPM | BODY MASS INDEX: 38.22 KG/M2 | TEMPERATURE: 99 F | HEIGHT: 74 IN

## 2020-07-20 DIAGNOSIS — E11.621 DIABETIC ULCER OF TOE OF LEFT FOOT ASSOCIATED WITH TYPE 2 DIABETES MELLITUS, LIMITED TO BREAKDOWN OF SKIN (HCC): Primary | ICD-10-CM

## 2020-07-20 DIAGNOSIS — L97.521 DIABETIC ULCER OF TOE OF LEFT FOOT ASSOCIATED WITH TYPE 2 DIABETES MELLITUS, LIMITED TO BREAKDOWN OF SKIN (HCC): Primary | ICD-10-CM

## 2020-07-20 DIAGNOSIS — L84 CORNS: ICD-10-CM

## 2020-07-20 PROCEDURE — 99212 OFFICE O/P EST SF 10 MIN: CPT | Performed by: PODIATRIST

## 2020-07-20 NOTE — PROGRESS NOTES
Patient presents for assessment of left great toe  Patient has chronic hyperkeratotic lesion plantar aspect left hallux with history of ulceration and possible osteomyelitis  On exam today, left great toe is erythematous and mildly edematous  No pain is reported  There has been no drainage  Hyperkeratotic lesion is present on the plantar aspect of the left hallux  Trimming of lesion reveals no evidence of ulceration  Periodic monthly care is recommended  Patient is going for his diabetic shoes and insoles this week  Patient is rescheduled in 4 weeks

## 2020-08-24 ENCOUNTER — OFFICE VISIT (OUTPATIENT)
Dept: PODIATRY | Facility: CLINIC | Age: 50
End: 2020-08-24
Payer: COMMERCIAL

## 2020-08-24 VITALS
DIASTOLIC BLOOD PRESSURE: 78 MMHG | SYSTOLIC BLOOD PRESSURE: 130 MMHG | HEART RATE: 98 BPM | TEMPERATURE: 98.3 F | BODY MASS INDEX: 39.06 KG/M2 | HEIGHT: 74 IN | WEIGHT: 304.4 LBS

## 2020-08-24 DIAGNOSIS — L84 CORNS: Primary | ICD-10-CM

## 2020-08-24 DIAGNOSIS — E11.42 DIABETIC POLYNEUROPATHY ASSOCIATED WITH TYPE 2 DIABETES MELLITUS (HCC): ICD-10-CM

## 2020-08-24 PROCEDURE — 99213 OFFICE O/P EST LOW 20 MIN: CPT | Performed by: PODIATRIST

## 2020-08-24 RX ORDER — GABAPENTIN 300 MG/1
300 CAPSULE ORAL
Qty: 30 CAPSULE | Refills: 0 | Status: SHIPPED | OUTPATIENT
Start: 2020-08-24 | End: 2020-09-23

## 2020-08-24 NOTE — PROGRESS NOTES
Assessment/Plan:    Trimmed hyperkeratotic lesion left great toe  No evidence of ulceration  Patient to continue with his diabetic shoes and insoles  Patient placed on gabapentin 300 mg HS with the goal being to decrease restless legs while sleeping  No problem-specific Assessment & Plan notes found for this encounter  Diagnoses and all orders for this visit:    [place]    Diabetic polyneuropathy associated with type 2 diabetes mellitus (HCC)  -     gabapentin (NEURONTIN) 300 mg capsule; Take 1 capsule (300 mg total) by mouth daily at bedtime          Subjective:      Patient ID: Mel Heard is a 52 y o  male  HPI     Patient presents for assessment of ulceration plantar aspect left hallux  Since his last visit patient has gotten his diabetic shoes and insoles  The callus persists beneath the left great toe  He states that it did bleed at 1 time since I last saw him  Patient also relates difficulty with restless leg  He has no neuropathy with diminished sensation in feet  The following portions of the patient's history were reviewed and updated as appropriate: allergies, current medications, past family history, past medical history, past social history, past surgical history and problem list     Review of Systems   Gastrointestinal: Negative  Musculoskeletal: Positive for gait problem  Neurological: Positive for numbness  Hematological: Negative  Objective:      /78   Pulse 98   Temp 98 3 °F (36 8 °C) (Tympanic)   Ht 6' 1 5" (1 867 m) Comment: verbal  Wt (!) 138 kg (304 lb 6 4 oz)   BMI 39 62 kg/m²          Physical Exam  Cardiovascular:      Pulses: Normal pulses  Musculoskeletal:         General: Swelling present  Comments: Left great toe remains mildly edematous  Skin:     Findings: Lesion present  Comments: Hyperkeratotic lesion plantar aspect left hallux   Neurological:      General: No focal deficit present        Mental Status: He is oriented to person, place, and time  Comments: Diminished sensorium per Darwin-Alejandro monofilament

## 2020-09-21 ENCOUNTER — OFFICE VISIT (OUTPATIENT)
Dept: PODIATRY | Facility: CLINIC | Age: 50
End: 2020-09-21
Payer: COMMERCIAL

## 2020-09-21 VITALS
DIASTOLIC BLOOD PRESSURE: 85 MMHG | SYSTOLIC BLOOD PRESSURE: 130 MMHG | BODY MASS INDEX: 40.09 KG/M2 | HEART RATE: 97 BPM | HEIGHT: 72 IN | WEIGHT: 296 LBS

## 2020-09-21 DIAGNOSIS — L84 CORNS: ICD-10-CM

## 2020-09-21 DIAGNOSIS — E11.42 DIABETIC POLYNEUROPATHY ASSOCIATED WITH TYPE 2 DIABETES MELLITUS (HCC): Primary | ICD-10-CM

## 2020-09-21 PROCEDURE — 99212 OFFICE O/P EST SF 10 MIN: CPT | Performed by: PODIATRIST

## 2020-09-21 NOTE — PROGRESS NOTES
Patient presents for assessment of left foot and to assess his response to gabapentin  A hyperkeratotic lesion with dried blood beneath the callus persists left great toe  On questioning, patient is uncertain as to whether gabapentin was of help  He was told to discontinue for now and we can always put in back on it if necessary  Trimmed hyperkeratotic lesion left great toe  No evidence of ulceration  Patient told that he may return to all activities  He will be reassessed in 4 weeks due to fragile nature of the of left great toe

## 2020-10-26 ENCOUNTER — OFFICE VISIT (OUTPATIENT)
Dept: PODIATRY | Facility: CLINIC | Age: 50
End: 2020-10-26
Payer: COMMERCIAL

## 2020-10-26 VITALS
HEART RATE: 101 BPM | DIASTOLIC BLOOD PRESSURE: 84 MMHG | HEIGHT: 72 IN | WEIGHT: 280 LBS | BODY MASS INDEX: 37.93 KG/M2 | SYSTOLIC BLOOD PRESSURE: 144 MMHG

## 2020-10-26 DIAGNOSIS — L84 CORNS: ICD-10-CM

## 2020-10-26 DIAGNOSIS — E11.42 DIABETIC POLYNEUROPATHY ASSOCIATED WITH TYPE 2 DIABETES MELLITUS (HCC): Primary | ICD-10-CM

## 2020-10-26 PROCEDURE — 99212 OFFICE O/P EST SF 10 MIN: CPT | Performed by: PODIATRIST

## 2020-11-23 ENCOUNTER — OFFICE VISIT (OUTPATIENT)
Dept: PODIATRY | Facility: CLINIC | Age: 50
End: 2020-11-23
Payer: COMMERCIAL

## 2020-11-23 VITALS
SYSTOLIC BLOOD PRESSURE: 158 MMHG | DIASTOLIC BLOOD PRESSURE: 82 MMHG | HEIGHT: 72 IN | BODY MASS INDEX: 38.06 KG/M2 | HEART RATE: 94 BPM | WEIGHT: 281 LBS

## 2020-11-23 DIAGNOSIS — L84 CORNS: ICD-10-CM

## 2020-11-23 DIAGNOSIS — E11.42 DIABETIC POLYNEUROPATHY ASSOCIATED WITH TYPE 2 DIABETES MELLITUS (HCC): Primary | ICD-10-CM

## 2020-11-23 PROCEDURE — 99212 OFFICE O/P EST SF 10 MIN: CPT | Performed by: PODIATRIST

## 2021-01-12 ENCOUNTER — OFFICE VISIT (OUTPATIENT)
Dept: PODIATRY | Facility: CLINIC | Age: 51
End: 2021-01-12
Payer: COMMERCIAL

## 2021-01-12 VITALS — BODY MASS INDEX: 38.06 KG/M2 | WEIGHT: 281 LBS | HEIGHT: 72 IN

## 2021-01-12 DIAGNOSIS — E11.42 DIABETIC POLYNEUROPATHY ASSOCIATED WITH TYPE 2 DIABETES MELLITUS (HCC): ICD-10-CM

## 2021-01-12 DIAGNOSIS — L84 CORNS: Primary | ICD-10-CM

## 2021-01-12 PROCEDURE — 99212 OFFICE O/P EST SF 10 MIN: CPT | Performed by: PODIATRIST

## 2021-01-12 NOTE — PROGRESS NOTES
Patient presents for palliative diabetic foot care  Patient was last seen 6 weeks ago  He has a thick hyperkeratotic lesion plantar aspect left hallux that is ulceration prone and has been ulcerated in the past   Treatment consisted of lesion trimming  There is dried blood within the callus but no evidence of ulceration today  Patient will be reassessed in 4 weeks rather than 6

## 2021-02-10 ENCOUNTER — OFFICE VISIT (OUTPATIENT)
Dept: PODIATRY | Facility: CLINIC | Age: 51
End: 2021-02-10
Payer: COMMERCIAL

## 2021-02-10 VITALS
SYSTOLIC BLOOD PRESSURE: 129 MMHG | HEART RATE: 101 BPM | HEIGHT: 73 IN | BODY MASS INDEX: 40.71 KG/M2 | DIASTOLIC BLOOD PRESSURE: 81 MMHG | WEIGHT: 307.2 LBS

## 2021-02-10 DIAGNOSIS — E11.42 DIABETIC POLYNEUROPATHY ASSOCIATED WITH TYPE 2 DIABETES MELLITUS (HCC): Primary | ICD-10-CM

## 2021-02-10 DIAGNOSIS — L84 CORNS: ICD-10-CM

## 2021-02-10 PROCEDURE — 99212 OFFICE O/P EST SF 10 MIN: CPT | Performed by: PODIATRIST

## 2021-02-10 NOTE — PROGRESS NOTES
Patient presents for assessment of ulcer prone hyperkeratotic lesion beneath left great toe  On exam, hyperkeratosis noted with a central nidus of dried blood  Trimmed hyperkeratosis from the toe and there is no evidence of ulceration or infection  Patient will be rescheduled in 5 weeks  No need for topical antibiotics  Patient has dry skin on each heel  He is advised to utilize a moisturizer, either gold Bond or bag balm

## 2021-03-29 ENCOUNTER — OFFICE VISIT (OUTPATIENT)
Dept: PODIATRY | Facility: CLINIC | Age: 51
End: 2021-03-29
Payer: COMMERCIAL

## 2021-03-29 VITALS
BODY MASS INDEX: 40.61 KG/M2 | DIASTOLIC BLOOD PRESSURE: 82 MMHG | HEART RATE: 68 BPM | SYSTOLIC BLOOD PRESSURE: 132 MMHG | HEIGHT: 73 IN | WEIGHT: 306.4 LBS

## 2021-03-29 DIAGNOSIS — L84 CORNS: ICD-10-CM

## 2021-03-29 DIAGNOSIS — E11.42 DIABETIC POLYNEUROPATHY ASSOCIATED WITH TYPE 2 DIABETES MELLITUS (HCC): Primary | ICD-10-CM

## 2021-03-29 PROCEDURE — 99212 OFFICE O/P EST SF 10 MIN: CPT | Performed by: PODIATRIST

## 2021-03-29 NOTE — PROGRESS NOTES
Patient again presents with ulceration prone callus on the undersurface of the left great toe  Frequent palliative care has been very helpful  There is no dried blood within the callus today  Treatment consisted of lesion trimming  No ulceration present  Patient will be reassessed in 6 weeks  Again recommended bag balm for dry skin on each heel

## 2021-05-12 ENCOUNTER — OFFICE VISIT (OUTPATIENT)
Dept: PODIATRY | Facility: CLINIC | Age: 51
End: 2021-05-12
Payer: COMMERCIAL

## 2021-05-12 VITALS
DIASTOLIC BLOOD PRESSURE: 83 MMHG | HEIGHT: 73 IN | WEIGHT: 303.2 LBS | SYSTOLIC BLOOD PRESSURE: 147 MMHG | BODY MASS INDEX: 40.18 KG/M2 | HEART RATE: 97 BPM

## 2021-05-12 DIAGNOSIS — L84 CORNS: ICD-10-CM

## 2021-05-12 DIAGNOSIS — E11.42 DIABETIC POLYNEUROPATHY ASSOCIATED WITH TYPE 2 DIABETES MELLITUS (HCC): Primary | ICD-10-CM

## 2021-05-12 PROCEDURE — 99212 OFFICE O/P EST SF 10 MIN: CPT | Performed by: PODIATRIST

## 2021-05-12 RX ORDER — OLMESARTAN MEDOXOMIL AND HYDROCHLOROTHIAZIDE 40/12.5 40; 12.5 MG/1; MG/1
1 TABLET ORAL DAILY
COMMUNITY
Start: 2021-05-05

## 2021-05-12 RX ORDER — PIOGLITAZONEHYDROCHLORIDE 30 MG/1
30 TABLET ORAL DAILY
COMMUNITY
Start: 2021-04-21

## 2021-05-12 RX ORDER — ROSUVASTATIN CALCIUM 20 MG/1
20 TABLET, COATED ORAL DAILY
COMMUNITY
Start: 2021-05-07

## 2021-05-12 RX ORDER — ESCITALOPRAM OXALATE 20 MG/1
20 TABLET ORAL DAILY
COMMUNITY
Start: 2021-04-12

## 2021-05-12 RX ORDER — SITAGLIPTIN AND METFORMIN HYDROCHLORIDE 500; 50 MG/1; MG/1
1 TABLET, FILM COATED ORAL 2 TIMES DAILY WITH MEALS
COMMUNITY
Start: 2021-05-07

## 2021-05-12 NOTE — PROGRESS NOTES
Patient presents for assessment of ulceration prone lesion plantar aspect left hallux  Patient is doing well with no evidence of ulceration at this time or infection at this time  Treatment:  Trimmed hyperkeratotic lesion left foot  Patient will be rescheduled for palliative care in 2 months    He was given another prescription for diabetic shoes and insoles at his request

## 2021-07-14 ENCOUNTER — OFFICE VISIT (OUTPATIENT)
Dept: PODIATRY | Facility: CLINIC | Age: 51
End: 2021-07-14
Payer: COMMERCIAL

## 2021-07-14 VITALS
HEART RATE: 96 BPM | SYSTOLIC BLOOD PRESSURE: 123 MMHG | HEIGHT: 73 IN | WEIGHT: 307 LBS | BODY MASS INDEX: 40.69 KG/M2 | DIASTOLIC BLOOD PRESSURE: 82 MMHG

## 2021-07-14 DIAGNOSIS — E11.621 DIABETIC ULCER OF TOE OF LEFT FOOT ASSOCIATED WITH TYPE 2 DIABETES MELLITUS, LIMITED TO BREAKDOWN OF SKIN (HCC): Primary | ICD-10-CM

## 2021-07-14 DIAGNOSIS — L97.521 DIABETIC ULCER OF TOE OF LEFT FOOT ASSOCIATED WITH TYPE 2 DIABETES MELLITUS, LIMITED TO BREAKDOWN OF SKIN (HCC): Primary | ICD-10-CM

## 2021-07-14 PROCEDURE — 97597 DBRDMT OPN WND 1ST 20 CM/<: CPT | Performed by: PODIATRIST

## 2021-07-14 NOTE — PROGRESS NOTES
Patient presents for assessment of left great toe  He notes the presence of a blood blister underneath the callus on the left hallux  No pain reported  On exam, hyperkeratotic lesion plantar aspect left great toe staying black secondary to a blood blister  No digital cellulitis present  No drainage noted  Partial-thickness debridement performed to the level of the dermis  The open area measures 1 0 cm in diameter  The open area does not probe to bone  Bacitracin dressing applied  Patient utilize Bactroban b i d  to site  He is rescheduled in 2 weeks

## 2021-08-17 ENCOUNTER — OFFICE VISIT (OUTPATIENT)
Dept: PODIATRY | Facility: CLINIC | Age: 51
End: 2021-08-17
Payer: COMMERCIAL

## 2021-08-17 VITALS
SYSTOLIC BLOOD PRESSURE: 138 MMHG | BODY MASS INDEX: 40.5 KG/M2 | DIASTOLIC BLOOD PRESSURE: 74 MMHG | HEIGHT: 73 IN | HEART RATE: 105 BPM

## 2021-08-17 DIAGNOSIS — L97.521 DIABETIC ULCER OF TOE OF LEFT FOOT ASSOCIATED WITH TYPE 2 DIABETES MELLITUS, LIMITED TO BREAKDOWN OF SKIN (HCC): Primary | ICD-10-CM

## 2021-08-17 DIAGNOSIS — E11.621 DIABETIC ULCER OF TOE OF LEFT FOOT ASSOCIATED WITH TYPE 2 DIABETES MELLITUS, LIMITED TO BREAKDOWN OF SKIN (HCC): Primary | ICD-10-CM

## 2021-08-17 PROCEDURE — 99212 OFFICE O/P EST SF 10 MIN: CPT | Performed by: PODIATRIST

## 2021-08-17 NOTE — PROGRESS NOTES
Patient presents for assessment of left great toe  Patient had ulceration on this digit at last visit  Currently, a calluses present with dried blood  On exam, pedal pulses are within normal limits  A hyperkeratotic lesion is present plantar aspect left hallux with dried blood  No evidence of infection or cellulitis  Trimmed hyperkeratotic tissue  No evidence of ulceration  Patient may discontinue topical antibiotic  Patient is starting a new job in August 30th that involves a significant amount of standing and walking  He will be rescheduled in 3 weeks

## 2021-09-02 ENCOUNTER — OFFICE VISIT (OUTPATIENT)
Dept: URGENT CARE | Age: 51
End: 2021-09-02
Payer: COMMERCIAL

## 2021-09-02 VITALS — OXYGEN SATURATION: 98 % | TEMPERATURE: 98.7 F | RESPIRATION RATE: 20 BRPM | HEART RATE: 89 BPM

## 2021-09-02 DIAGNOSIS — Z20.822 ENCOUNTER FOR SCREENING LABORATORY TESTING FOR COVID-19 VIRUS: ICD-10-CM

## 2021-09-02 DIAGNOSIS — J06.9 VIRAL UPPER RESPIRATORY TRACT INFECTION: Primary | ICD-10-CM

## 2021-09-02 PROCEDURE — 99213 OFFICE O/P EST LOW 20 MIN: CPT | Performed by: NURSE PRACTITIONER

## 2021-09-02 PROCEDURE — U0005 INFEC AGEN DETEC AMPLI PROBE: HCPCS

## 2021-09-02 PROCEDURE — U0003 INFECTIOUS AGENT DETECTION BY NUCLEIC ACID (DNA OR RNA); SEVERE ACUTE RESPIRATORY SYNDROME CORONAVIRUS 2 (SARS-COV-2) (CORONAVIRUS DISEASE [COVID-19]), AMPLIFIED PROBE TECHNIQUE, MAKING USE OF HIGH THROUGHPUT TECHNOLOGIES AS DESCRIBED BY CMS-2020-01-R: HCPCS

## 2021-09-02 NOTE — PROGRESS NOTES
[facility]        NAME: Larissa Aldridge is a 54 y o  male  : 1970    MRN: [id number]  DATE: 2021  TIME: 11:54 AM    Assessment and Plan   Viral upper respiratory tract infection [J06 9]  1  Viral upper respiratory tract infection     2  Encounter for screening laboratory testing for COVID-19 virus  Novel Coronavirus (Covid-19),PCR [facility]         Patient Instructions     --Rest, drink plenty of fluids  --COVID testing sent  Will call with results  Average turn around time is 48-72 hours  --Advise self-quarantining until you hear back from us regarding test results (at the earliest)  This involves not leaving your house, wearing a mask at all times while outside your immediate living area, and disinfecting all commonly used surfaces and personal items  If your COVID test results are positive, you will need to self-quarantine at home for at least 10 days from the onset of your symptoms, until you are feeling better, and until you are without a fever for at least 72 hours  --For nasal/sinus congestion, helpful measures include steam, warm compresses, an OTC saline nasal spray or Neti pot, or an OTC decongestant (such as Sudafed)  The decongestant should be avoided, however, if you are under 10years of age, or have a history of high blood pressure or heart disease  In addition, an OTC nasal steroid (Flonase, Nasocort) or nasal decongestant (Afrin, Yobany-synephrine) may be taken  The nasal steroid should be used at bedtime, after the saline nasal spray  The nasal decongestant should not be taken more than 3 days consecutively in order to prevent rebound congestion  --For nasal drainage, postnasal drip, sneezing and itching, an OTC antihistamine (Allegra, Benadryl, etc) can be taken  --For cough, you can take an OTC expectorant such as plain Robitussion or Mucinex (active ingredient guaifenesin)  A spoonful of honey at bedtime may also be helpful, as may a prescription cough medicine  Also recommended is the use of a cool mist humidifier (with or without Vicks) in the bedroom at night  --For sore throat, you can take OTC lozenges, use warm gargles (salt water or apple cider vinegar and honey), herbal teas, or an OTC throat spray (Chloraseptic)  --You can take Tylenol or Motrin/Advil as needed for fever, headache, body aches  Motrin/Advil should be avoided, however, if you have a history of heart disease, bleeding ulcers, or if you take blood thinners  --You should contact your primary care provider and/or go to the ER if your symptoms are not improved or get worse over the next 7 days  This includes new onset fever, localized ear pain, sinus pain, as well as worsening cough, chest pain, shortness of breath, or significant weakness/fatigue  Chief Complaint     Chief Complaint   Patient presents with    Headache     pt c/o headache,pt exposed to family member with covid    Cough         History of Present Illness         Here with complaints of nasal congestion, rhinorrhea, cough x 4 days  No fever, sore throat, anosmia, abdominal pain, N/V/D  Lives with aunt who tested positive for COVID in the  past few days  She is symptomatic  Fully vaccinated  Does have allergies also, but this feels different  No OTC meds taken  Review of Systems   Review of Systems   Constitutional: Negative for fever  HENT: Positive for rhinorrhea  Negative for sore throat  Respiratory: Positive for cough  Neurological: Negative for headaches  Current Medications       Current Outpatient Medications:     aspirin 81 MG tablet, Take 81 mg by mouth daily  , Disp: , Rfl:     escitalopram (LEXAPRO) 10 mg tablet, Take 10 mg by mouth daily  , Disp: , Rfl:     escitalopram (LEXAPRO) 20 mg tablet, Take 20 mg by mouth daily, Disp: , Rfl:     fenofibrate (TRICOR) 145 mg tablet, Take 145 mg by mouth daily, Disp: , Rfl:     gabapentin (NEURONTIN) 300 mg capsule, Take 1 capsule (300 mg total) by mouth daily at bedtime, Disp: 30 capsule, Rfl: 0    glimepiride (AMARYL) 4 mg tablet, glimepiride 4 mg tablet  TAKE 1 TABLET BY MOUTH ONCE DAILY, Disp: , Rfl:     Janumet  MG per tablet, Take 1 tablet by mouth 2 (two) times a day with meals, Disp: , Rfl:     mupirocin (BACTROBAN) 2 % ointment, Apply topically 2 (two) times a day, Disp: 22 g, Rfl: 0    olmesartan-hydrochlorothiazide (BENICAR HCT) 40-12 5 MG per tablet, Take 1 tablet by mouth daily, Disp: , Rfl:     pioglitazone (ACTOS) 15 mg tablet, Take 15 mg by mouth daily at bedtime  , Disp: , Rfl:     pioglitazone (ACTOS) 30 mg tablet, Take 30 mg by mouth daily, Disp: , Rfl:     rosuvastatin (CRESTOR) 10 MG tablet, Take 10 mg by mouth daily  , Disp: , Rfl:     rosuvastatin (CRESTOR) 20 MG tablet, Take 20 mg by mouth daily, Disp: , Rfl:     SSD 1 % cream, APPLY CREAM EVERY DAY AS NEEDED, Disp: , Rfl:     Current Allergies     Allergies as of 09/02/2021    (No Known Allergies)            The following portions of the patient's history were reviewed and updated as appropriate: allergies, current medications, past family history, past medical history, past social history, past surgical history and problem list      Past Medical History:   Diagnosis Date    Diabetes mellitus ([facility] [address] )     Hypertension        Past Surgical History:   Procedure Laterality Date    CARDIAC CATHETERIZATION      KNEE ARTHROSCOPY      right knee    IA REMV PILONIDAL LESION EXTENS N/A 4/25/2018    Procedure: UNROOFING PILONIDAL DISEASE;  Surgeon: Heaven Howard MD;  Location: [facility];  Service: Colorectal    SHOULDER SURGERY         History reviewed  No pertinent family history  Medications have been verified  Objective   Pulse 89   Temp 98 7 °F (37 1 °C)   Resp 20   SpO2 98%   No LMP for male patient  Physical Exam     Physical Exam  Constitutional:       General: He is not in acute distress    HENT:      Right Ear: Tympanic membrane and ear canal normal       Left Ear: Tympanic membrane and ear canal normal       Nose: Congestion and rhinorrhea present  Mouth/Throat:      Pharynx: No posterior oropharyngeal erythema  Cardiovascular:      Rate and Rhythm: Normal rate and regular rhythm  Pulmonary:      Effort: Pulmonary effort is normal    Neurological:      Mental Status: He is alert and oriented to person, place, and time     Psychiatric:         Mood and Affect: Mood normal

## 2021-09-02 NOTE — PATIENT INSTRUCTIONS
--Rest, drink plenty of fluids  --COVID testing sent  Will call with results  Average turn around time is 48-72 hours  --Advise self-quarantining until you hear back from us regarding test results (at the earliest)  This involves not leaving your house, wearing a mask at all times while outside your immediate living area, and disinfecting all commonly used surfaces and personal items  If your COVID test results are positive, you will need to self-quarantine at home for at least 10 days from the onset of your symptoms, until you are feeling better, and until you are without a fever for at least 72 hours  --For nasal/sinus congestion, helpful measures include steam, warm compresses, an OTC saline nasal spray or Neti pot, or an OTC decongestant (such as Sudafed)  The decongestant should be avoided, however, if you are under 10years of age, or have a history of high blood pressure or heart disease  In addition, an OTC nasal steroid (Flonase, Nasocort) or nasal decongestant (Afrin, Yobany-synephrine) may be taken  The nasal steroid should be used at bedtime, after the saline nasal spray  The nasal decongestant should not be taken more than 3 days consecutively in order to prevent rebound congestion  --For nasal drainage, postnasal drip, sneezing and itching, an OTC antihistamine (Allegra, Benadryl, etc) can be taken  --For cough, you can take an OTC expectorant such as plain Robitussion or Mucinex (active ingredient guaifenesin)  A spoonful of honey at bedtime may also be helpful, as may a prescription cough medicine  Also recommended is the use of a cool mist humidifier (with or without Vicks) in the bedroom at night  --For sore throat, you can take OTC lozenges, use warm gargles (salt water or apple cider vinegar and honey), herbal teas, or an OTC throat spray (Chloraseptic)  --You can take Tylenol or Motrin/Advil as needed for fever, headache, body aches   Motrin/Advil should be avoided, however, if you have a history of heart disease, bleeding ulcers, or if you take blood thinners  --You should contact your primary care provider and/or go to the ER if your symptoms are not improved or get worse over the next 7 days  This includes new onset fever, localized ear pain, sinus pain, as well as worsening cough, chest pain, shortness of breath, or significant weakness/fatigue

## 2021-09-03 LAB — SARS-COV-2 RNA RESP QL NAA+PROBE: NEGATIVE

## 2021-09-09 ENCOUNTER — OFFICE VISIT (OUTPATIENT)
Dept: PODIATRY | Facility: CLINIC | Age: 51
End: 2021-09-09
Payer: COMMERCIAL

## 2021-09-09 VITALS
DIASTOLIC BLOOD PRESSURE: 75 MMHG | HEIGHT: 73 IN | SYSTOLIC BLOOD PRESSURE: 111 MMHG | WEIGHT: 311 LBS | HEART RATE: 103 BPM | BODY MASS INDEX: 41.22 KG/M2

## 2021-09-09 DIAGNOSIS — E11.42 DIABETIC POLYNEUROPATHY ASSOCIATED WITH TYPE 2 DIABETES MELLITUS (HCC): Primary | ICD-10-CM

## 2021-09-09 DIAGNOSIS — L84 CORNS: ICD-10-CM

## 2021-09-09 PROCEDURE — 99212 OFFICE O/P EST SF 10 MIN: CPT | Performed by: PODIATRIST

## 2021-09-09 RX ORDER — BLOOD SUGAR DIAGNOSTIC
STRIP MISCELLANEOUS
COMMUNITY
Start: 2021-08-27

## 2021-09-09 RX ORDER — BLOOD-GLUCOSE METER
KIT MISCELLANEOUS
COMMUNITY
Start: 2021-08-27

## 2021-09-09 NOTE — PROGRESS NOTES
Patient presents for assessment of left great toe  There is a hyperkeratotic lesion present on the plantar aspect of the digit  There is no evidence of ulceration following trimming of the lesion  Patient is now wearing his diabetic shoes and insoles  Patient will be reassessed in 4 weeks

## 2021-10-14 ENCOUNTER — OFFICE VISIT (OUTPATIENT)
Dept: PODIATRY | Facility: CLINIC | Age: 51
End: 2021-10-14
Payer: COMMERCIAL

## 2021-10-14 VITALS
SYSTOLIC BLOOD PRESSURE: 160 MMHG | WEIGHT: 309 LBS | HEIGHT: 73 IN | BODY MASS INDEX: 40.95 KG/M2 | DIASTOLIC BLOOD PRESSURE: 81 MMHG | HEART RATE: 99 BPM

## 2021-10-14 DIAGNOSIS — E11.42 DIABETIC POLYNEUROPATHY ASSOCIATED WITH TYPE 2 DIABETES MELLITUS (HCC): Primary | ICD-10-CM

## 2021-10-14 DIAGNOSIS — L84 CORNS: ICD-10-CM

## 2021-10-14 PROCEDURE — 99212 OFFICE O/P EST SF 10 MIN: CPT | Performed by: PODIATRIST

## 2021-11-15 ENCOUNTER — OFFICE VISIT (OUTPATIENT)
Dept: PODIATRY | Facility: CLINIC | Age: 51
End: 2021-11-15
Payer: COMMERCIAL

## 2021-11-15 VITALS
BODY MASS INDEX: 41.22 KG/M2 | HEIGHT: 73 IN | DIASTOLIC BLOOD PRESSURE: 79 MMHG | WEIGHT: 311 LBS | HEART RATE: 94 BPM | SYSTOLIC BLOOD PRESSURE: 133 MMHG

## 2021-11-15 DIAGNOSIS — L84 CORNS: ICD-10-CM

## 2021-11-15 DIAGNOSIS — E11.42 DIABETIC POLYNEUROPATHY ASSOCIATED WITH TYPE 2 DIABETES MELLITUS (HCC): Primary | ICD-10-CM

## 2021-11-15 PROCEDURE — 99212 OFFICE O/P EST SF 10 MIN: CPT | Performed by: PODIATRIST

## 2021-12-16 ENCOUNTER — OFFICE VISIT (OUTPATIENT)
Dept: PODIATRY | Facility: CLINIC | Age: 51
End: 2021-12-16
Payer: COMMERCIAL

## 2021-12-16 VITALS
SYSTOLIC BLOOD PRESSURE: 142 MMHG | HEART RATE: 106 BPM | WEIGHT: 305 LBS | DIASTOLIC BLOOD PRESSURE: 85 MMHG | BODY MASS INDEX: 40.42 KG/M2 | HEIGHT: 73 IN

## 2021-12-16 DIAGNOSIS — E11.42 DIABETIC POLYNEUROPATHY ASSOCIATED WITH TYPE 2 DIABETES MELLITUS (HCC): Primary | ICD-10-CM

## 2021-12-16 DIAGNOSIS — L84 CORNS: ICD-10-CM

## 2021-12-16 PROCEDURE — 99212 OFFICE O/P EST SF 10 MIN: CPT | Performed by: PODIATRIST

## 2022-01-17 ENCOUNTER — OFFICE VISIT (OUTPATIENT)
Dept: PODIATRY | Facility: CLINIC | Age: 52
End: 2022-01-17
Payer: COMMERCIAL

## 2022-01-17 VITALS
DIASTOLIC BLOOD PRESSURE: 83 MMHG | HEART RATE: 87 BPM | BODY MASS INDEX: 40.45 KG/M2 | WEIGHT: 305.2 LBS | HEIGHT: 73 IN | SYSTOLIC BLOOD PRESSURE: 151 MMHG

## 2022-01-17 DIAGNOSIS — L84 CORNS: ICD-10-CM

## 2022-01-17 DIAGNOSIS — E11.42 DIABETIC POLYNEUROPATHY ASSOCIATED WITH TYPE 2 DIABETES MELLITUS (HCC): Primary | ICD-10-CM

## 2022-01-17 PROCEDURE — 99212 OFFICE O/P EST SF 10 MIN: CPT | Performed by: PODIATRIST

## 2022-01-17 NOTE — PROGRESS NOTES
Patient presents for assessment of left great toe  He has an ulceration prone lesion plantar aspect left great toe  Treatment consisted of lesion trimming  No evidence of infection or ulceration at this time  Patient told to continue with monthly care  He is rescheduled in 1 month

## 2022-02-14 ENCOUNTER — HOSPITAL ENCOUNTER (EMERGENCY)
Facility: HOSPITAL | Age: 52
Discharge: HOME | End: 2022-02-14
Attending: EMERGENCY MEDICINE | Admitting: EMERGENCY MEDICINE

## 2022-02-14 ENCOUNTER — APPOINTMENT (EMERGENCY)
Dept: RADIOLOGY | Facility: HOSPITAL | Age: 52
End: 2022-02-14

## 2022-02-14 VITALS
RESPIRATION RATE: 18 BRPM | DIASTOLIC BLOOD PRESSURE: 90 MMHG | OXYGEN SATURATION: 100 % | HEART RATE: 101 BPM | SYSTOLIC BLOOD PRESSURE: 159 MMHG | TEMPERATURE: 98.2 F

## 2022-02-14 DIAGNOSIS — R07.89 LEFT-SIDED CHEST WALL PAIN: ICD-10-CM

## 2022-02-14 DIAGNOSIS — M79.602 LEFT ARM PAIN: ICD-10-CM

## 2022-02-14 DIAGNOSIS — M54.9 MUSCULOSKELETAL BACK PAIN: ICD-10-CM
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
## 2022-02-14 PROCEDURE — 99284 EMERGENCY DEPT VISIT MOD MDM: CPT | Performed by: EMERGENCY MEDICINE

## 2022-02-14 PROCEDURE — 96372 THER/PROPH/DIAG INJ SC/IM: CPT

## 2022-02-14 PROCEDURE — 71045 X-RAY EXAM CHEST 1 VIEW: CPT

## 2022-02-14 PROCEDURE — 99284 EMERGENCY DEPT VISIT MOD MDM: CPT

## 2022-02-14 RX ORDER — KETOROLAC TROMETHAMINE 30 MG/ML
15 INJECTION, SOLUTION INTRAMUSCULAR; INTRAVENOUS ONCE
Status: COMPLETED | OUTPATIENT
Start: 2022-02-14 | End: 2022-02-14

## 2022-02-14 RX ORDER — ACETAMINOPHEN 325 MG/1
650 TABLET ORAL ONCE
Status: COMPLETED | OUTPATIENT
Start: 2022-02-14 | End: 2022-02-14

## 2022-02-14 RX ORDER — METHOCARBAMOL 500 MG/1
500 TABLET, FILM COATED ORAL 2 TIMES DAILY
Qty: 20 TABLET | Refills: 0 | Status: SHIPPED | OUTPATIENT
Start: 2022-02-14

## 2022-02-14 RX ADMIN — KETOROLAC TROMETHAMINE 15 MG: 30 INJECTION, SOLUTION INTRAMUSCULAR at 18:48

## 2022-02-14 RX ADMIN — ACETAMINOPHEN 650 MG: 325 TABLET, FILM COATED ORAL at 18:48
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
## 2022-02-15 NOTE — DISCHARGE INSTRUCTIONS
Please use the following pain medications as prescribed:  - Tylenol 650mg every 6 hours  - Motrin 400mg every 6 hours  They work in different ways so can be used together at the same time  Follow up with your primary care physician, return to the ED if symptoms worsen
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
## 2022-03-24 ENCOUNTER — OFFICE VISIT (OUTPATIENT)
Dept: PODIATRY | Facility: CLINIC | Age: 52
End: 2022-03-24
Payer: COMMERCIAL

## 2022-03-24 VITALS
BODY MASS INDEX: 40.42 KG/M2 | HEART RATE: 97 BPM | SYSTOLIC BLOOD PRESSURE: 149 MMHG | DIASTOLIC BLOOD PRESSURE: 84 MMHG | HEIGHT: 73 IN | WEIGHT: 305 LBS

## 2022-03-24 DIAGNOSIS — L84 CORNS: ICD-10-CM

## 2022-03-24 DIAGNOSIS — E11.42 DIABETIC POLYNEUROPATHY ASSOCIATED WITH TYPE 2 DIABETES MELLITUS (HCC): Primary | ICD-10-CM

## 2022-03-24 PROCEDURE — 99212 OFFICE O/P EST SF 10 MIN: CPT | Performed by: PODIATRIST
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
## 2022-05-03 ENCOUNTER — OFFICE VISIT (OUTPATIENT)
Dept: PODIATRY | Facility: CLINIC | Age: 52
End: 2022-05-03

## 2022-05-03 VITALS
DIASTOLIC BLOOD PRESSURE: 78 MMHG | BODY MASS INDEX: 39.23 KG/M2 | HEIGHT: 73 IN | HEART RATE: 98 BPM | WEIGHT: 296 LBS | SYSTOLIC BLOOD PRESSURE: 148 MMHG

## 2022-05-03 DIAGNOSIS — E11.42 DIABETIC POLYNEUROPATHY ASSOCIATED WITH TYPE 2 DIABETES MELLITUS (HCC): Primary | ICD-10-CM

## 2022-05-03 DIAGNOSIS — L84 CORNS: ICD-10-CM

## 2022-05-03 PROCEDURE — NCFTCARE PR NON-COVERED FOOT CARE: Performed by: PODIATRIST

## 2022-05-03 NOTE — PROGRESS NOTES
Patient presents with callus beneath the left great toe  No evidence of ulceration  No discomfort related  Treatment consisted of lesion trimming  Patient is rescheduled in 6 weeks

## 2022-06-16 ENCOUNTER — OFFICE VISIT (OUTPATIENT)
Dept: PODIATRY | Facility: CLINIC | Age: 52
End: 2022-06-16

## 2022-06-16 VITALS
WEIGHT: 294.6 LBS | HEART RATE: 110 BPM | SYSTOLIC BLOOD PRESSURE: 145 MMHG | BODY MASS INDEX: 39.04 KG/M2 | HEIGHT: 73 IN | DIASTOLIC BLOOD PRESSURE: 73 MMHG

## 2022-06-16 DIAGNOSIS — E11.42 DIABETIC POLYNEUROPATHY ASSOCIATED WITH TYPE 2 DIABETES MELLITUS (HCC): Primary | ICD-10-CM

## 2022-06-16 PROCEDURE — 99212 OFFICE O/P EST SF 10 MIN: CPT | Performed by: PODIATRIST

## 2022-06-16 NOTE — PROGRESS NOTES
Patient presents for palliative foot care  Patient has hyperkeratotic lesion beneath left hallux  Treatment consisted of lesion trimming  No evidence of ulceration  Pedal pulses are palpable  Patient will be rescheduled in 6 weeks

## 2022-07-28 ENCOUNTER — OFFICE VISIT (OUTPATIENT)
Dept: PODIATRY | Facility: CLINIC | Age: 52
End: 2022-07-28
Payer: COMMERCIAL

## 2022-07-28 VITALS
SYSTOLIC BLOOD PRESSURE: 126 MMHG | BODY MASS INDEX: 38.87 KG/M2 | HEART RATE: 101 BPM | HEIGHT: 73 IN | DIASTOLIC BLOOD PRESSURE: 78 MMHG

## 2022-07-28 DIAGNOSIS — E11.42 DIABETIC POLYNEUROPATHY ASSOCIATED WITH TYPE 2 DIABETES MELLITUS (HCC): Primary | ICD-10-CM

## 2022-07-28 DIAGNOSIS — L84 CORNS: ICD-10-CM

## 2022-07-28 PROCEDURE — 99212 OFFICE O/P EST SF 10 MIN: CPT | Performed by: PODIATRIST

## 2022-07-28 NOTE — PROGRESS NOTES
Patient presents for palliative diabetic foot care  No acute disorder noted  Hyperkeratotic lesion present beneath left hallux  No sign of ulceration  Treatment consisted of lesion trimming  Patient is rescheduled for 8 weeks

## 2022-08-31 ENCOUNTER — TELEPHONE (OUTPATIENT)
Dept: PODIATRY | Facility: CLINIC | Age: 52
End: 2022-08-31

## 2022-09-01 ENCOUNTER — OFFICE VISIT (OUTPATIENT)
Dept: PODIATRY | Facility: CLINIC | Age: 52
End: 2022-09-01
Payer: COMMERCIAL

## 2022-09-01 VITALS
SYSTOLIC BLOOD PRESSURE: 119 MMHG | HEART RATE: 90 BPM | DIASTOLIC BLOOD PRESSURE: 76 MMHG | WEIGHT: 284.4 LBS | HEIGHT: 73 IN | BODY MASS INDEX: 37.69 KG/M2

## 2022-09-01 DIAGNOSIS — L60.3 NAIL DYSTROPHY: ICD-10-CM

## 2022-09-01 DIAGNOSIS — L84 CORNS: ICD-10-CM

## 2022-09-01 DIAGNOSIS — E11.42 DIABETIC POLYNEUROPATHY ASSOCIATED WITH TYPE 2 DIABETES MELLITUS (HCC): Primary | ICD-10-CM

## 2022-09-01 PROCEDURE — 99212 OFFICE O/P EST SF 10 MIN: CPT | Performed by: PODIATRIST

## 2022-09-01 NOTE — PROGRESS NOTES
Patient presents with concern regarding redness and nail discoloration left great toenail  Patient has no discomfort but does have neuropathy  He also has a thick callus beneath the left hallux  On exam, left great toenail is dystrophic but no evidence of ingrown nail or paronychia  Thick hyperkeratotic lesion present plantar aspect left foot  No evidence of cellulitis or infection  Treatment consisted of lesion trimming  Reassured patient that he does not have an infection at his left great toenail  He will be reassessed in 4 weeks

## 2022-10-10 ENCOUNTER — OFFICE VISIT (OUTPATIENT)
Dept: PODIATRY | Facility: CLINIC | Age: 52
End: 2022-10-10
Payer: COMMERCIAL

## 2022-10-10 VITALS
HEIGHT: 73 IN | DIASTOLIC BLOOD PRESSURE: 71 MMHG | WEIGHT: 283.4 LBS | BODY MASS INDEX: 37.56 KG/M2 | SYSTOLIC BLOOD PRESSURE: 135 MMHG | HEART RATE: 97 BPM

## 2022-10-10 DIAGNOSIS — E11.42 DIABETIC POLYNEUROPATHY ASSOCIATED WITH TYPE 2 DIABETES MELLITUS (HCC): Primary | ICD-10-CM

## 2022-10-10 DIAGNOSIS — L84 CORNS: ICD-10-CM

## 2022-10-10 PROCEDURE — 99212 OFFICE O/P EST SF 10 MIN: CPT | Performed by: PODIATRIST

## 2022-10-10 NOTE — PROGRESS NOTES
Patient presents for diabetic foot care  Hyperkeratotic lesion present beneath left hallux  No sign of ulceration following trimming of lesion  No evidence of left great toenail paronychia  Patient will be reassessed in 4 weeks

## 2022-11-10 ENCOUNTER — OFFICE VISIT (OUTPATIENT)
Dept: PODIATRY | Facility: CLINIC | Age: 52
End: 2022-11-10

## 2022-11-10 VITALS
HEART RATE: 91 BPM | WEIGHT: 287 LBS | SYSTOLIC BLOOD PRESSURE: 152 MMHG | HEIGHT: 73 IN | BODY MASS INDEX: 38.04 KG/M2 | DIASTOLIC BLOOD PRESSURE: 85 MMHG

## 2022-11-10 DIAGNOSIS — E11.42 DIABETIC POLYNEUROPATHY ASSOCIATED WITH TYPE 2 DIABETES MELLITUS (HCC): Primary | ICD-10-CM

## 2022-11-10 DIAGNOSIS — L84 CORNS: ICD-10-CM

## 2022-11-10 NOTE — PROGRESS NOTES
Patient presents for palliative diabetic foot care  Patient has ulceration prone callus left hallux  No acute disorder noted  Lesion was trimmed and there is no evidence of ulceration today  Patient will be rescheduled in 5 weeks for palliative care

## 2023-02-02 ENCOUNTER — OFFICE VISIT (OUTPATIENT)
Dept: PODIATRY | Facility: CLINIC | Age: 53
End: 2023-02-02

## 2023-02-02 VITALS
WEIGHT: 287 LBS | HEART RATE: 89 BPM | DIASTOLIC BLOOD PRESSURE: 85 MMHG | SYSTOLIC BLOOD PRESSURE: 155 MMHG | HEIGHT: 73 IN | BODY MASS INDEX: 38.04 KG/M2

## 2023-02-02 DIAGNOSIS — E11.42 DIABETIC POLYNEUROPATHY ASSOCIATED WITH TYPE 2 DIABETES MELLITUS (HCC): Primary | ICD-10-CM

## 2023-02-02 DIAGNOSIS — L84 CORNS: ICD-10-CM

## 2023-02-02 NOTE — PROGRESS NOTES
Patient presents for palliative diabetic foot care  No acute disorder noted  Thick calluses present on the plantar aspect of the left hallux  This lesion was trimmed  There is no evidence of ulceration  Old elongated toenails were trimmed as well  Patient is rescheduled in 2 months

## 2023-04-03 ENCOUNTER — OFFICE VISIT (OUTPATIENT)
Dept: PODIATRY | Facility: CLINIC | Age: 53
End: 2023-04-03

## 2023-04-03 VITALS
HEART RATE: 88 BPM | SYSTOLIC BLOOD PRESSURE: 157 MMHG | DIASTOLIC BLOOD PRESSURE: 88 MMHG | HEIGHT: 73 IN | BODY MASS INDEX: 38.04 KG/M2 | WEIGHT: 287 LBS

## 2023-04-03 DIAGNOSIS — L84 CORNS: ICD-10-CM

## 2023-04-03 DIAGNOSIS — E11.42 DIABETIC POLYNEUROPATHY ASSOCIATED WITH TYPE 2 DIABETES MELLITUS (HCC): Primary | ICD-10-CM

## 2023-04-03 NOTE — PROGRESS NOTES
Patient presents for palliative diabetic foot care  It has been 2 months since his last visit  Callus persists on the plantar aspect of the left great toe and it is extremely thick and hard  Treatment consisted of lesion trimming  No evidence of ulceration  Patient to be rescheduled for 6 weeks as at 8 weeks he is to prone for ulceration

## 2023-06-27 ENCOUNTER — OFFICE VISIT (OUTPATIENT)
Dept: PODIATRY | Facility: CLINIC | Age: 53
End: 2023-06-27
Payer: COMMERCIAL

## 2023-06-27 VITALS
HEIGHT: 73 IN | HEART RATE: 99 BPM | WEIGHT: 275 LBS | SYSTOLIC BLOOD PRESSURE: 124 MMHG | DIASTOLIC BLOOD PRESSURE: 84 MMHG | BODY MASS INDEX: 36.45 KG/M2 | RESPIRATION RATE: 18 BRPM

## 2023-06-27 DIAGNOSIS — E11.42 DIABETIC POLYNEUROPATHY ASSOCIATED WITH TYPE 2 DIABETES MELLITUS (HCC): Primary | ICD-10-CM

## 2023-06-27 DIAGNOSIS — L84 CORNS: ICD-10-CM

## 2023-06-27 DIAGNOSIS — L60.3 NAIL DYSTROPHY: ICD-10-CM

## 2023-06-27 PROCEDURE — 99212 OFFICE O/P EST SF 10 MIN: CPT | Performed by: PODIATRIST

## 2023-06-27 NOTE — PROGRESS NOTES
Patient presents for palliative diabetic foot care  No acute disorder noted  Pedal pulses are palpable  Treatment consists of nail and lesion trimming  No evidence of left great toe ulceration  Patient is rescheduled in 2 months

## 2023-08-29 ENCOUNTER — OFFICE VISIT (OUTPATIENT)
Dept: PODIATRY | Facility: CLINIC | Age: 53
End: 2023-08-29
Payer: COMMERCIAL

## 2023-08-29 VITALS
WEIGHT: 270 LBS | DIASTOLIC BLOOD PRESSURE: 78 MMHG | HEART RATE: 87 BPM | HEIGHT: 73 IN | SYSTOLIC BLOOD PRESSURE: 156 MMHG | RESPIRATION RATE: 18 BRPM | BODY MASS INDEX: 35.78 KG/M2

## 2023-08-29 DIAGNOSIS — L60.3 NAIL DYSTROPHY: Primary | ICD-10-CM

## 2023-08-29 DIAGNOSIS — E11.42 DIABETIC POLYNEUROPATHY ASSOCIATED WITH TYPE 2 DIABETES MELLITUS (HCC): ICD-10-CM

## 2023-08-29 DIAGNOSIS — L84 CORNS: ICD-10-CM

## 2023-08-29 PROCEDURE — 99212 OFFICE O/P EST SF 10 MIN: CPT | Performed by: PODIATRIST

## 2023-08-29 NOTE — PROGRESS NOTES
Patient presents for palliative diabetic foot care. No acute disorder noted. Pedal pulses are palpable. Treatment consists of nail and lesion trimming.

## 2023-11-09 ENCOUNTER — TELEPHONE (OUTPATIENT)
Dept: PODIATRY | Facility: CLINIC | Age: 53
End: 2023-11-09

## 2023-11-09 NOTE — TELEPHONE ENCOUNTER
Caller: Joanna Gallegos    Doctor: EDWARD GarayM    Reason for call: Jolynn Bell missed today's appointment due to work. He said he really needs to be seen and Dr Maged Duenas said he should call if this ever happens.     Call back#: [phone]

## 2023-11-20 ENCOUNTER — OFFICE VISIT (OUTPATIENT)
Dept: PODIATRY | Facility: CLINIC | Age: 53
End: 2023-11-20
Payer: COMMERCIAL

## 2023-11-20 VITALS
HEIGHT: 73 IN | RESPIRATION RATE: 18 BRPM | BODY MASS INDEX: 35.62 KG/M2 | SYSTOLIC BLOOD PRESSURE: 160 MMHG | DIASTOLIC BLOOD PRESSURE: 75 MMHG | HEART RATE: 91 BPM

## 2023-11-20 DIAGNOSIS — E11.42 DIABETIC POLYNEUROPATHY ASSOCIATED WITH TYPE 2 DIABETES MELLITUS (HCC): Primary | ICD-10-CM

## 2023-11-20 DIAGNOSIS — L84 CORNS: ICD-10-CM

## 2023-11-20 PROCEDURE — 99212 OFFICE O/P EST SF 10 MIN: CPT | Performed by: PODIATRIST

## 2023-11-20 NOTE — PROGRESS NOTES
Patient presents for diabetic foot care. On exam, thick hyperkeratotic lesion that is ulceration prone plantar aspect left hallux. Pedal pulses are palpable. All toenails are elongated and dystrophic. Treatment consisted of nail and lesion trimming. Prescribed diabetic shoes and insoles. Patient is a candidate as he has an ulcer prone callus left hallux and this has been ulcerated in the past.  He is rescheduled in 6 weeks.

## 2023-12-14 ENCOUNTER — OFFICE VISIT (OUTPATIENT)
Dept: PODIATRY | Facility: CLINIC | Age: 53
End: 2023-12-14
Payer: COMMERCIAL

## 2023-12-14 VITALS
BODY MASS INDEX: 35.62 KG/M2 | HEIGHT: 73 IN | HEART RATE: 93 BPM | RESPIRATION RATE: 18 BRPM | DIASTOLIC BLOOD PRESSURE: 74 MMHG | SYSTOLIC BLOOD PRESSURE: 135 MMHG

## 2023-12-14 DIAGNOSIS — L84 CORNS: ICD-10-CM

## 2023-12-14 DIAGNOSIS — E11.42 DIABETIC POLYNEUROPATHY ASSOCIATED WITH TYPE 2 DIABETES MELLITUS (HCC): Primary | ICD-10-CM

## 2023-12-14 PROCEDURE — 99212 OFFICE O/P EST SF 10 MIN: CPT | Performed by: PODIATRIST

## 2023-12-14 NOTE — PROGRESS NOTES
Patient again presents for palliative diabetic footcare. Thick ulcer from callus present beneath the left hallux. Trimming of lesion reveals no evidence of ulcer today. Patient urged to use moisturizer more regularly. He is rescheduled in 4 weeks.

## 2024-01-02 ENCOUNTER — OFFICE VISIT (OUTPATIENT)
Dept: PODIATRY | Facility: CLINIC | Age: 54
End: 2024-01-02
Payer: COMMERCIAL

## 2024-01-02 VITALS
RESPIRATION RATE: 18 BRPM | SYSTOLIC BLOOD PRESSURE: 134 MMHG | BODY MASS INDEX: 35.62 KG/M2 | DIASTOLIC BLOOD PRESSURE: 77 MMHG | HEART RATE: 94 BPM | HEIGHT: 73 IN

## 2024-01-02 DIAGNOSIS — E11.42 DIABETIC POLYNEUROPATHY ASSOCIATED WITH TYPE 2 DIABETES MELLITUS (HCC): Primary | ICD-10-CM

## 2024-01-02 DIAGNOSIS — L60.3 NAIL DYSTROPHY: ICD-10-CM

## 2024-01-02 DIAGNOSIS — L84 CORNS: ICD-10-CM

## 2024-01-02 PROCEDURE — 99212 OFFICE O/P EST SF 10 MIN: CPT | Performed by: PODIATRIST

## 2024-01-02 NOTE — PROGRESS NOTES
Patient presents for palliative diabetic footcare.  No acute disorder noted.  Patient has been using a moisturizer and his feet are much improved because of it.  Trimmed hyperkeratotic lesion plantar aspect left hallux.  No evidence of ulceration.  Reappoint 5 weeks.

## 2024-02-06 ENCOUNTER — OFFICE VISIT (OUTPATIENT)
Dept: PODIATRY | Facility: CLINIC | Age: 54
End: 2024-02-06
Payer: COMMERCIAL

## 2024-02-06 VITALS
WEIGHT: 258 LBS | SYSTOLIC BLOOD PRESSURE: 105 MMHG | DIASTOLIC BLOOD PRESSURE: 73 MMHG | RESPIRATION RATE: 18 BRPM | BODY MASS INDEX: 34.19 KG/M2 | HEART RATE: 88 BPM | HEIGHT: 73 IN

## 2024-02-06 DIAGNOSIS — L84 CORNS: ICD-10-CM

## 2024-02-06 DIAGNOSIS — E11.42 DIABETIC POLYNEUROPATHY ASSOCIATED WITH TYPE 2 DIABETES MELLITUS (HCC): Primary | ICD-10-CM

## 2024-02-06 PROCEDURE — 99212 OFFICE O/P EST SF 10 MIN: CPT | Performed by: PODIATRIST

## 2024-02-06 NOTE — PROGRESS NOTES
Patient presents for palliative diabetic footcare.  Hyperkeratotic lesion plantar aspect left hallux persists but there is no evidence of ulceration.  Treatment consisted of nail and lesion trimming.  Pedal pulses are within normal limits.

## 2024-03-21 ENCOUNTER — OFFICE VISIT (OUTPATIENT)
Dept: PODIATRY | Facility: CLINIC | Age: 54
End: 2024-03-21
Payer: COMMERCIAL

## 2024-03-21 VITALS
SYSTOLIC BLOOD PRESSURE: 131 MMHG | BODY MASS INDEX: 34.46 KG/M2 | HEART RATE: 83 BPM | HEIGHT: 73 IN | WEIGHT: 260 LBS | DIASTOLIC BLOOD PRESSURE: 84 MMHG

## 2024-03-21 DIAGNOSIS — E11.42 DIABETIC POLYNEUROPATHY ASSOCIATED WITH TYPE 2 DIABETES MELLITUS (HCC): Primary | ICD-10-CM

## 2024-03-21 DIAGNOSIS — L84 CORNS: ICD-10-CM

## 2024-03-21 PROCEDURE — 99212 OFFICE O/P EST SF 10 MIN: CPT | Performed by: PODIATRIST

## 2024-03-21 NOTE — PROGRESS NOTES
Patient presents for palliative diabetic footcare.  Thick hyperkeratotic lesion present plantar aspect left hallux.  This lesion was trimmed.  No evidence of ulceration.  Pedal pulses are palpable.  Reappoint 6 weeks.

## 2024-05-02 ENCOUNTER — OFFICE VISIT (OUTPATIENT)
Dept: PODIATRY | Facility: CLINIC | Age: 54
End: 2024-05-02
Payer: COMMERCIAL

## 2024-05-02 VITALS
BODY MASS INDEX: 34.46 KG/M2 | SYSTOLIC BLOOD PRESSURE: 138 MMHG | DIASTOLIC BLOOD PRESSURE: 78 MMHG | RESPIRATION RATE: 18 BRPM | HEART RATE: 83 BPM | HEIGHT: 73 IN | WEIGHT: 260 LBS

## 2024-05-02 DIAGNOSIS — L84 CORNS: ICD-10-CM

## 2024-05-02 DIAGNOSIS — E11.42 DIABETIC POLYNEUROPATHY ASSOCIATED WITH TYPE 2 DIABETES MELLITUS (HCC): Primary | ICD-10-CM

## 2024-05-02 DIAGNOSIS — L60.3 NAIL DYSTROPHY: ICD-10-CM

## 2024-05-02 PROCEDURE — 99212 OFFICE O/P EST SF 10 MIN: CPT | Performed by: PODIATRIST

## 2024-05-02 NOTE — PROGRESS NOTES
Patient presents for palliative diabetic footcare.  No acute disorder noted.  Pedal pulses are palpable.  Treatment consisted of trimming of thick hyperkeratotic lesion plantar aspect left hallux.  0.2 months for palliative care.

## 2024-07-09 ENCOUNTER — OFFICE VISIT (OUTPATIENT)
Dept: PODIATRY | Facility: CLINIC | Age: 54
End: 2024-07-09
Payer: COMMERCIAL

## 2024-07-09 VITALS — BODY MASS INDEX: 34.3 KG/M2 | HEIGHT: 73 IN | RESPIRATION RATE: 18 BRPM

## 2024-07-09 DIAGNOSIS — L03.032 CELLULITIS OF GREAT TOE, LEFT: ICD-10-CM

## 2024-07-09 DIAGNOSIS — E11.42 DIABETIC POLYNEUROPATHY ASSOCIATED WITH TYPE 2 DIABETES MELLITUS (HCC): Primary | ICD-10-CM

## 2024-07-09 PROCEDURE — 99213 OFFICE O/P EST LOW 20 MIN: CPT | Performed by: PODIATRIST

## 2024-07-10 NOTE — PROGRESS NOTES
Patient presents for diabetic footcare.  Patient relates that last week his left great toe became very red.  He was concerned that he could have infection and took amoxicillin that he had at the house.  Currently, the erythema is noted but decreased in pain though is not present.    Upon questioning he notes that he did have drainage and bleeding from the lateral nail border of the left great toenail.  This has resolved.    Currently there is a thick callus on plantar aspect of the left hallux along with blistering of the lateral nail border of the hallux.  Callus was trimmed to are also removed.    No need for additional antibiotics at this time as infection appears to have resolved.  Reappoint 7 weeks.

## 2024-08-27 ENCOUNTER — OFFICE VISIT (OUTPATIENT)
Dept: PODIATRY | Facility: CLINIC | Age: 54
End: 2024-08-27
Payer: COMMERCIAL

## 2024-08-27 VITALS — RESPIRATION RATE: 18 BRPM | BODY MASS INDEX: 34.46 KG/M2 | WEIGHT: 260 LBS | HEIGHT: 73 IN

## 2024-08-27 DIAGNOSIS — L84 CORNS: ICD-10-CM

## 2024-08-27 DIAGNOSIS — L60.3 NAIL DYSTROPHY: ICD-10-CM

## 2024-08-27 DIAGNOSIS — E11.42 DIABETIC POLYNEUROPATHY ASSOCIATED WITH TYPE 2 DIABETES MELLITUS (HCC): Primary | ICD-10-CM

## 2024-08-27 PROCEDURE — 99212 OFFICE O/P EST SF 10 MIN: CPT | Performed by: PODIATRIST

## 2024-08-27 NOTE — PROGRESS NOTES
Patient presents for palliative diabetic footcare.  Overall, patient is doing well.  No discomfort related.  On exam, hyperkeratotic lesion plantar aspect left hallux.  Each great toenail is thick and yellow with subungual debris.    Treatment consisted of trimming of hyperkeratotic lesion left foot.  Debridement of each mycotic toenail performed.  Reappoint 7 weeks.

## 2024-10-15 ENCOUNTER — OFFICE VISIT (OUTPATIENT)
Dept: PODIATRY | Facility: CLINIC | Age: 54
End: 2024-10-15
Payer: COMMERCIAL

## 2024-10-15 VITALS — RESPIRATION RATE: 18 BRPM | BODY MASS INDEX: 33.62 KG/M2 | WEIGHT: 262 LBS | HEIGHT: 74 IN

## 2024-10-15 DIAGNOSIS — E11.42 DIABETIC POLYNEUROPATHY ASSOCIATED WITH TYPE 2 DIABETES MELLITUS (HCC): ICD-10-CM

## 2024-10-15 DIAGNOSIS — L60.3 NAIL DYSTROPHY: Primary | ICD-10-CM

## 2024-10-15 DIAGNOSIS — L84 CORNS: ICD-10-CM

## 2024-10-15 PROCEDURE — 99212 OFFICE O/P EST SF 10 MIN: CPT | Performed by: PODIATRIST

## 2024-10-15 NOTE — PROGRESS NOTES
Patient presents for assessment of hyperkeratotic lesion left hallux.  There has been improvement as the lesion is more superficial.  Patient states that he is using moisturizing cream and it i has been helpful.    Treatment consisted of nail trimming and removing of hyperkeratotic lesion left hallux.  No evidence of ulcer.  Pedal pulses are trace.  Reappoint 8 weeks.

## 2024-12-17 ENCOUNTER — OFFICE VISIT (OUTPATIENT)
Dept: PODIATRY | Facility: CLINIC | Age: 54
End: 2024-12-17
Payer: COMMERCIAL

## 2024-12-17 VITALS — HEART RATE: 101 BPM | SYSTOLIC BLOOD PRESSURE: 173 MMHG | DIASTOLIC BLOOD PRESSURE: 80 MMHG

## 2024-12-17 DIAGNOSIS — L84 CORNS: ICD-10-CM

## 2024-12-17 DIAGNOSIS — E11.42 DIABETIC POLYNEUROPATHY ASSOCIATED WITH TYPE 2 DIABETES MELLITUS (HCC): Primary | ICD-10-CM

## 2024-12-17 PROCEDURE — 99213 OFFICE O/P EST LOW 20 MIN: CPT | Performed by: PODIATRIST

## 2024-12-17 NOTE — PROGRESS NOTES
Name: Nathan Kemp      : 1970      MRN: [id number]  Encounter Provider: Harish Nur DPM  Encounter Date: 2024   Encounter department: [facility] [place]    Discussed principles of diabetic footcare.  Vascular status is within normal lips but sensorium is diminished.  Trimmed hyperkeratotic lesion left hallux and the callus is superficial.  Patient urged to refrain from walking barefoot.  Reappoint 3 months.  Explained that going for palliative callus care is not a covered service as his circulation is excellent.  :  Assessment & Plan        History of Present Illness   HPI  Nathan Kemp is a 54 y.o. male who presents for yearly diabetic foot exam and footcare.  Patient relates no foot discomfort at this time.  He does have numbness in both feet secondary to neuropathy.  We do not have an A1c on record as he does not go to a [facility] physician.  He has a superficial callus on the bottom of the left great toe.      Review of Systems   Gastrointestinal: Negative.    Neurological:  Positive for numbness. Negative for weakness.              Objective   BP (!) 173/80   Pulse 101      Physical Exam  Cardiovascular:      Pulses: no weak pulses.           Dorsalis pedis pulses are 2+ on the right side and 2+ on the left side.        Posterior tibial pulses are 2+ on the right side and 2+ on the left side.   Feet:      Right foot:      Skin integrity: No ulcer, skin breakdown, erythema, warmth, callus or dry skin.      Left foot:      Skin integrity: Callus present. No ulcer, skin breakdown, erythema, warmth or dry skin.         Diabetic Foot Exam    Patient's shoes and socks removed.    Right Foot/Ankle   Right Foot Inspection  Skin Exam: skin normal and skin intact. No dry skin, no warmth, no callus, no erythema, no maceration, no abnormal color, no pre-ulcer, no ulcer and no callus.     Toe Exam: ROM and strength within normal limits.     Sensory   Vibration: absent  Proprioception:  intact  Monofilament testing: diminished    Vascular  Capillary refills: < 3 seconds  The right DP pulse is 2+. The right PT pulse is 2+.     Right Toe  - Comprehensive Exam  Ecchymosis: none  Arch: normal  Hammertoes: absent  Claw Toes: absent  Swelling: none   Tenderness: none       Left Foot/Ankle  Left Foot Inspection  Skin Exam: skin normal, skin intact and callus. No dry skin, no warmth, no erythema, no maceration, normal color, no pre-ulcer and no ulcer.     Toe Exam: ROM and strength within normal limits.     Sensory   Vibration: diminished  Proprioception: intact  Monofilament testing: absent    Vascular  Capillary refills: < 3 seconds  The left DP pulse is 2+. The left PT pulse is 2+.     Left Toe  - Comprehensive Exam  Ecchymosis: none  Arch: normal  Hammertoes: absent  Claw toes: absent  Swelling: none   Tenderness: none       Assign Risk Category  Deformity present  Loss of protective sensation  No weak pulses  Risk: 2

## 2025-03-18 ENCOUNTER — OFFICE VISIT (OUTPATIENT)
Dept: PODIATRY | Facility: CLINIC | Age: 55
End: 2025-03-18

## 2025-03-18 DIAGNOSIS — E11.42 DIABETIC POLYNEUROPATHY ASSOCIATED WITH TYPE 2 DIABETES MELLITUS (HCC): Primary | ICD-10-CM

## 2025-03-18 DIAGNOSIS — L84 CORNS: ICD-10-CM

## 2025-03-18 PROCEDURE — NCFTCARE PR NON-COVERED FOOT CARE: Performed by: PODIATRIST

## 2025-03-18 NOTE — PROGRESS NOTES
Patient presents for palliative care.  He is a type II diabetic with neuropathy.  Pedal pulses are palpable.  Treatment consisted of trimming of elongated nails and hyperkeratotic lesion left hallux.  Reappoint 3 months.

## 2025-06-17 ENCOUNTER — PROCEDURE VISIT (OUTPATIENT)
Dept: PODIATRY | Facility: CLINIC | Age: 55
End: 2025-06-17

## 2025-06-17 DIAGNOSIS — L60.3 NAIL DYSTROPHY: ICD-10-CM

## 2025-06-17 DIAGNOSIS — L84 CORNS: ICD-10-CM

## 2025-06-17 DIAGNOSIS — E11.42 DIABETIC POLYNEUROPATHY ASSOCIATED WITH TYPE 2 DIABETES MELLITUS (HCC): Primary | ICD-10-CM

## 2025-06-17 PROCEDURE — NCFTCARE PR NON-COVERED FOOT CARE: Performed by: PODIATRIST

## 2025-06-17 NOTE — PROGRESS NOTES
"Name: Nathan Kemp      : 1970      MRN: [id number]  Encounter Provider: Harish Nur DPM  Encounter Date: 2025   Encounter department: [facility] PODIAT[facility] [place]    Treatment consisted of nail and lesion trimming.  Reappoint 3 months.  :  Assessment & Plan  Diabetic polyneuropathy associated with type 2 diabetes mellitus (HCC)    No results found for: \"HGBA1C\"         Corns  Lesion trimming       Nail dystrophy             History of Present Illness   HPI  Nathan Kemp is a 55 y.o. male who presents for palliative diabetic footcare.  No acute disorder noted.  Callus remains on the undersurface of the left great toe but is asymptomatic and becoming more more superficial.  All toenails are elongated with great toenails dystrophic.      Review of Systems       Objective   There were no vitals taken for this visit.     Physical Exam    Cardiovascular:      Pulses: Normal pulses.     Skin:     Findings: Lesion present.      Comments: Hyperkeratotic lesion beneath left hallux ; great toenails dystrophic and thick               "

## 2025-07-18 ENCOUNTER — APPOINTMENT (EMERGENCY)
Dept: RADIOLOGY | Facility: HOSPITAL | Age: 55
End: 2025-07-18
Payer: COMMERCIAL

## 2025-07-18 ENCOUNTER — HOSPITAL ENCOUNTER (EMERGENCY)
Facility: HOSPITAL | Age: 55
Discharge: HOME | End: 2025-07-18
Attending: EMERGENCY MEDICINE
Payer: COMMERCIAL

## 2025-07-18 VITALS
RESPIRATION RATE: 18 BRPM | DIASTOLIC BLOOD PRESSURE: 74 MMHG | SYSTOLIC BLOOD PRESSURE: 156 MMHG | OXYGEN SATURATION: 99 % | HEART RATE: 90 BPM | TEMPERATURE: 97.7 F

## 2025-07-18 DIAGNOSIS — L03.011 PARONYCHIA OF FINGER OF RIGHT HAND: Primary | ICD-10-CM

## 2025-07-18 PROCEDURE — 73130 X-RAY EXAM OF HAND: CPT

## 2025-07-18 PROCEDURE — 99283 EMERGENCY DEPT VISIT LOW MDM: CPT

## 2025-07-18 PROCEDURE — 99284 EMERGENCY DEPT VISIT MOD MDM: CPT | Performed by: EMERGENCY MEDICINE

## 2025-07-18 RX ORDER — CEPHALEXIN 500 MG/1
500 CAPSULE ORAL ONCE
Status: COMPLETED | OUTPATIENT
Start: 2025-07-18 | End: 2025-07-18

## 2025-07-18 RX ORDER — CEPHALEXIN 500 MG/1
500 CAPSULE ORAL 2 TIMES DAILY
Qty: 13 CAPSULE | Refills: 0 | Status: SHIPPED | OUTPATIENT
Start: 2025-07-18 | End: 2025-07-25

## 2025-07-18 RX ORDER — CEPHALEXIN 500 MG/1
500 CAPSULE ORAL EVERY 12 HOURS SCHEDULED
Qty: 13 CAPSULE | Refills: 0 | Status: SHIPPED | OUTPATIENT
Start: 2025-07-18 | End: 2025-07-18 | Stop reason: SDUPTHER

## 2025-07-18 RX ADMIN — CEPHALEXIN 500 MG: 500 CAPSULE ORAL at 10:33

## 2025-07-18 NOTE — ED ATTENDING ATTESTATION
7/18/2025  I, Filomena Negron MD, saw and evaluated the patient. I have discussed the patient with the resident/non-physician practitioner and agree with the resident's/non-physician practitioner's findings, Plan of Care, and MDM as documented in the resident's/non-physician practitioner's note, except where noted. All available labs and Radiology studies were reviewed.  I was present for key portions of any procedure(s) performed by the resident/non-physician practitioner and I was immediately available to provide assistance.       At this point I agree with the current assessment done in the Emergency Department.  I have conducted an independent evaluation of this patient a history and physical is as follows:    OA: 54 y/o m right-hand-dominant c/o right index finger finger swelling/pain at the cuticle over the past day.  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  Has no known exposures.  Patient noted some swelling around his right index finger nailbed.  Pain with touch.  Otherwise denies any pain with movement.  No other swelling of the finger.  No change in sensation.  No fevers no chills no numbness no weakness no tingling.  Did hit his hand against his bedside table which caused increased pain.  Took over-the-counter medications with mild improvement.  Patient is a type II diabetic so did want to come in for evaluation.  Denies any other trauma.  Denies any other exposures.  On exam patient is nontoxic resting comfortably.  Vital signs stable.  HEENT is normocephalic atraumatic.  Heart is regular rate.  Lungs are clear.  Abdomen soft positive bowel sounds.  Patient has slight area of erythema and minimal area of fluctuance along the inferior level of the nailbed.  No swelling along palmar surface.  No fusiform swelling of digit.  Is able to flex and extend at MCP DIP and PIP.  Intact sensation throughout.  Not erythematous.  No warmth.  No vesicles  or lesions otherwise.  Intact pulses.  Capillary for less than 2 seconds.  Assessment and plan concern for paronychia.  Given hit his hand will check an x-ray.  Patient is amenable to incision for drainage.  Will also give antibiotics given history of type 2 diabetes.    ED Course     Tdap 2023    Patient with scant amount of purulent material and small piece of wooden splinter removed.  Immediate improvement of symptoms.  Patient has no bleeding at site.  Understands risk benefits.  Understands return precautions including but not limited to swelling pain bleeding discharge fever chills or other concerns.  Antibiotic prescription given.    Critical Care Time  Procedures

## 2025-07-18 NOTE — DISCHARGE INSTRUCTIONS
You were seen in the ED for a paronychia on the pointer finger of your R hand. Today we drained the paronychia and gave you your first dose of the antibiotic Keflex/cephalexin. We also prescribed you antibiotics to take after you leave the hospital. Please take them as prescribed on the bottle.   Please return to the Emergency Department if the finger get more red and swollen around the nail, if you start having a hard time moving your finger or if there is pain with movement of that finger or other fingers. Other return precautions include pain on the pad of your finger, fever, chills, nausea, vomiting.  Please follow-up with PCP.

## 2025-07-18 NOTE — ED PROVIDER NOTES
Time reflects when diagnosis was documented in both MDM as applicable and the Disposition within this note       Time User Action Codes Description Comment    7/18/2025 11:39 AM Martina Pascual [L03.011] Paronychia of finger of right hand           ED Disposition       ED Disposition   Discharge    Condition   Stable    Date/Time   Fri Jul 18, 2025 12:00 PM    Comment   Nathan Kemp discharge to home/self care.                   Assessment & Plan       Medical Decision Making  Amount and/or Complexity of Data Reviewed  Radiology: ordered and independent interpretation performed.    Risk  Prescription drug management.    The patient is a 55-year-old male with no relevant past medical history who presents with redness and swelling of the right index finger. He is in no acute distress and reports pain only when the area is touched or bumped. Vital signs are within normal limits: blood pressure is 156/74 mmHg, temperature is 97.7°F, heart rate is 90 beats per minute, respiratory rate is 18 breaths per minute, and oxygen saturation is 99% on room air.    The differential diagnosis includes paronychia, abscess, and soft tissue infection.    An X-ray of the hand will be obtained, as the patient reports minor trauma to the area at work. The affected area will be soaked, and an incision and drainage (I&D) procedure will be performed if clinically indicated. The patient will be discharged with antibiotics, as his history of diabetes increases his susceptibility to infection. He will also be discharged with strict return precautions and instructions for close follow-up, given his history of diabetes and diabetic neuropathy, which place him at higher risk for poor wound healing and secondary infection.            Medications   cephalexin (KEFLEX) capsule 500 mg (500 mg Oral Given 7/18/25 1033)       ED Risk Strat Scores                    No data recorded        SBIRT 22yo+      Flowsheet Row Most Recent Value   Initial  Alcohol Screen: US AUDIT-C     1. How often do you have a drink containing alcohol? 0 Filed at: 07/18/2025 0945   2. How many drinks containing alcohol do you have on a typical day you are drinking?  0 Filed at: 07/18/2025 0945   3a. Male UNDER 65: How often do you have five or more drinks on one occasion? 0 Filed at: 07/18/2025 0945   3b. FEMALE Any Age, or MALE 65+: How often do you have 4 or more drinks on one occassion? 0 Filed at: 07/18/2025 0945   Audit-C Score 0 Filed at: 07/18/2025 0945   SERGIO: How many times in the past year have you...    Used an illegal drug or used a prescription medication for non-medical reasons? Never Filed at: 07/18/2025 0945                            History of Present Illness       Chief Complaint   Patient presents with    Hand Swelling     Presents with right pointer finger pain at the cuticle x 1 day.        Past Medical History[1]   Past Surgical History[2]   Family History[3]   Social History[4]   E-Cigarette/Vaping    E-Cigarette Use Former User       E-Cigarette/Vaping Substances    Nicotine Yes     THC No     CBD No     Flavoring Yes     Other No     Unknown No       I have reviewed and agree with the history as documented.     NELA Kemp is a 55-year-old male with a past medical history of diabetes and diabetic neuropathy who presents with 2 days of swelling in the right index finger. 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 Today, he reports the area is less red. He denies fever,shortness of breath. Does report he did hit his hand against something over the last couples days, does not remember exactly what.     Review of Systems        Objective       ED Triage Vitals [07/18/25 0943]   Temperature Pulse Blood Pressure Respirations SpO2 Patient Position - Orthostatic VS   97.7 °F (36.5 °C) 99 (!) 179/91 18 99 % Sitting      Temp Source Heart Rate Source BP Location FiO2 (%) Pain Score     Temporal Monitor Right arm -- 5      Vitals      Date and Time Temp Pulse SpO2 Resp BP Pain Score FACES Pain Rating User   07/18/25 1221 -- 90 -- -- 156/74 -- -- RAP   07/18/25 0943 97.7 °F (36.5 °C) 99 99 % 18 179/91 5 -- AT            Physical Exam  Vitals and nursing note reviewed.   Constitutional:       General: He is not in acute distress.     Appearance: He is well-developed.     Cardiovascular:      Rate and Rhythm: Normal rate and regular rhythm.      Heart sounds: Normal heart sounds.   Pulmonary:      Effort: Pulmonary effort is normal. No respiratory distress.      Breath sounds: Normal breath sounds.   Abdominal:      Palpations: Abdomen is soft.      Tenderness: There is no abdominal tenderness.     Musculoskeletal:         General: No swelling.     Skin:     General: Skin is warm and dry.      Capillary Refill: Capillary refill takes less than 2 seconds.      Comments: Localized erythema, swelling, and tenderness at the lateral nail fold of the right index finger     Neurological:      Mental Status: He is alert.     Psychiatric:         Mood and Affect: Mood normal.         Results Reviewed       None            XR hand 3+ views RIGHT   ED Interpretation by Martina Pascual DO (07/18 1114)   Do not visualize fracture, dislocation, foreign body.          Procedures    ED Medication and Procedure Management   Prior to Admission Medications   Prescriptions Last Dose Informant Patient Reported? Taking?   Blood Glucose Monitoring Suppl (OneTouch Verio Reflect) w/Device KIT  Self Yes No   Sig: USE TO CHECK GLUCOSE DAILY   Janumet  MG per tablet  Self Yes No   Sig: Take 1 tablet by mouth 2 (two) times a day with meals   OneTouch Verio test strip  Self Yes No   Sig: USE STRIP TO CHECK GLUCOSE DAILY   SSD 1 % cream  Self Yes No   Sig: APPLY CREAM EVERY DAY AS NEEDED   aspirin 81 MG tablet  Self Yes No   Sig: Take 81 mg by mouth daily.   escitalopram (LEXAPRO) 10 mg tablet  Self Yes No   Sig: Take 10  mg by mouth daily.   Patient not taking: Reported on 9/9/2021   escitalopram (LEXAPRO) 20 mg tablet  Self Yes No   Sig: Take 20 mg by mouth daily   fenofibrate (TRICOR) 145 mg tablet  Self Yes No   Sig: Take 145 mg by mouth daily   gabapentin (NEURONTIN) 300 mg capsule   No No   Sig: Take 1 capsule (300 mg total) by mouth daily at bedtime   glimepiride (AMARYL) 4 mg tablet  Self Yes No   Sig: glimepiride 4 mg tablet   TAKE 1 TABLET BY MOUTH ONCE DAILY   Patient not taking: Reported on 6/16/2022   methocarbamol (ROBAXIN) 500 mg tablet  Self No No   Sig: Take 1 tablet (500 mg total) by mouth 2 (two) times a day   Patient not taking: Reported on 6/16/2022   mupirocin (BACTROBAN) 2 % ointment  Self No No   Sig: Apply topically 2 (two) times a day   olmesartan-hydrochlorothiazide (BENICAR HCT) 40-12.5 MG per tablet  Self Yes No   Sig: Take 1 tablet by mouth daily   pioglitazone (ACTOS) 15 mg tablet  Self Yes No   Sig: Take 15 mg by mouth daily at bedtime   Patient not taking: Reported on 6/16/2022   pioglitazone (ACTOS) 30 mg tablet  Self Yes No   Sig: Take 30 mg by mouth daily   rosuvastatin (CRESTOR) 10 MG tablet  Self Yes No   Sig: Take 10 mg by mouth daily   rosuvastatin (CRESTOR) 20 MG tablet  Self Yes No   Sig: Take 20 mg by mouth daily      Facility-Administered Medications: None     Discharge Medication List as of 7/18/2025 12:20 PM        START taking these medications    Details   cephalexin (KEFLEX) 500 mg capsule Take 1 capsule (500 mg total) by mouth 2 (two) times a day for 7 days, Starting Fri 7/18/2025, Until Fri 7/25/2025, Normal           CONTINUE these medications which have NOT CHANGED    Details   aspirin 81 MG tablet Take 81 mg by mouth daily., Historical Med      Blood Glucose Monitoring Suppl (OneTouch Verio Reflect) w/Device KIT USE TO CHECK GLUCOSE DAILY, Historical Med      !! escitalopram (LEXAPRO) 10 mg tablet Take 10 mg by mouth daily., Historical Med      !! escitalopram (LEXAPRO) 20 mg  tablet Take 20 mg by mouth daily, Starting Mon 4/12/2021, Historical Med      fenofibrate (TRICOR) 145 mg tablet Take 145 mg by mouth daily, Historical Med      gabapentin (NEURONTIN) 300 mg capsule Take 1 capsule (300 mg total) by mouth daily at bedtime, Starting Mon 8/24/2020, Until Wed 9/23/2020, Normal      glimepiride (AMARYL) 4 mg tablet glimepiride 4 mg tablet   TAKE 1 TABLET BY MOUTH ONCE DAILY, Historical Med      Janumet  MG per tablet Take 1 tablet by mouth 2 (two) times a day with meals, Starting Fri 5/7/2021, Historical Med      methocarbamol (ROBAXIN) 500 mg tablet Take 1 tablet (500 mg total) by mouth 2 (two) times a day, Starting Mon 2/14/2022, Normal      mupirocin (BACTROBAN) 2 % ointment Apply topically 2 (two) times a day, Starting Wed 7/14/2021, Normal      olmesartan-hydrochlorothiazide (BENICAR HCT) 40-12.5 MG per tablet Take 1 tablet by mouth daily, Starting Wed 5/5/2021, Historical Med      OneTouch Verio test strip USE STRIP TO CHECK GLUCOSE DAILY, Historical Med      !! pioglitazone (ACTOS) 15 mg tablet Take 15 mg by mouth daily at bedtime, Historical Med      !! pioglitazone (ACTOS) 30 mg tablet Take 30 mg by mouth daily, Starting Wed 4/21/2021, Historical Med      !! rosuvastatin (CRESTOR) 10 MG tablet Take 10 mg by mouth daily, Historical Med      !! rosuvastatin (CRESTOR) 20 MG tablet Take 20 mg by mouth daily, Starting Fri 5/7/2021, Historical Med      SSD 1 % cream APPLY CREAM EVERY DAY AS NEEDED, Historical Med       !! - Potential duplicate medications found. Please discuss with provider.        No discharge procedures on file.  ED SEPSIS DOCUMENTATION   Time reflects when diagnosis was documented in both MDM as applicable and the Disposition within this note       Time User Action Codes Description Comment    7/18/2025 11:39 AM Martina Pascual Add [L03.011] Paronychia of finger of right hand                    Martina Pascual,   07/18/25 2223         [1]   Past Medical  History:  Diagnosis Date    Diabetes mellitus (HCC)     Hypertension    [2]   Past Surgical History:  Procedure Laterality Date    CARDIAC CATHETERIZATION      KNEE ARTHROSCOPY      right knee    NE EXCISION PILONIDAL CYST/SINUS EXTENSIVE N/A 4/25/2018    Procedure: UNROOFING PILONIDAL DISEASE;  Surgeon: Giovanny Angela MD;  Location: [facility];  Service: Colorectal    SHOULDER SURGERY     [3] No family history on file.  [4]   Social History  Tobacco Use    Smoking status: Every Day     Current packs/day: 1.00     Types: Cigarettes    Smokeless tobacco: Never   Vaping Use    Vaping status: Former    Substances: Nicotine, Flavoring   Substance Use Topics    Alcohol use: Yes    Drug use: No        Martina Pascual DO  07/18/25 2450

## 2025-07-19 NOTE — ED PROCEDURE NOTE
Procedure  Incision and drain    Date/Time: 7/18/2025 12:00 PM    Performed by: Martina Pascual DO  Authorized by: Martina Pascual DO    Universal Protocol:  Consent: Verbal consent obtained  Risks and benefits: risks, benefits and alternatives were discussed  Consent given by: patient  Patient understanding: patient states understanding of the procedure being performed  Patient consent: the patient's understanding of the procedure matches consent given  Procedure consent: procedure consent matches procedure scheduled  Patient identity confirmed: verbally with patient    Patient location:  ED  Location:     Size:  Paronychia    Location:  Upper extremity    Upper extremity location:  R index finger  Pre-procedure details:     Skin preparation:  Chloraprep  Procedure details:     Complexity:  Simple    Needle aspiration: no      Incision types:  Single straight    Scalpel blade:  10    Approach:  Puncture    Wound management:  Irrigated with saline and probed and deloculated    Drainage:  Bloody and purulent    Drainage amount:  Scant    Wound treatment:  Wound left open    Packing materials:  None  Post-procedure details:     Patient tolerance of procedure:  Tolerated well, no immediate complications  Comments:      Small 4mm incision made to release purulence. Wrapped and antibiotics given.                    Martina Pascual DO  07/18/25 1582

## (undated) DEVICE — PLUMEPEN PRO 10FT

## (undated) DEVICE — GLOVE INDICATOR PI UNDERGLOVE SZ 7.5 BLUE

## (undated) DEVICE — STRL UNIVERSAL MINOR GENERAL: Brand: CARDINAL HEALTH

## (undated) DEVICE — NEEDLE 25G X 1 1/2

## (undated) DEVICE — INTENDED FOR TISSUE SEPARATION, AND OTHER PROCEDURES THAT REQUIRE A SHARP SURGICAL BLADE TO PUNCTURE OR CUT.: Brand: BARD-PARKER SAFETY BLADES SIZE 15, STERILE

## (undated) DEVICE — ABDOMINAL PAD: Brand: DERMACEA

## (undated) DEVICE — SPONGE STICK WITH PVP-I: Brand: KENDALL

## (undated) DEVICE — GOWN ,SIRUS ,NONREINFORCED 4XL: Brand: MEDLINE

## (undated) DEVICE — 3M™ DURAPORE™ SURGICAL TAPE 1538-3, 3 INCH X 10 YARD (7,5CM X 9,1M), 4 ROLLS/BOX: Brand: 3M™ DURAPORE™

## (undated) DEVICE — SUT CHROMIC 3-0 SH 27 IN G122H

## (undated) DEVICE — GAUZE SPONGES,16 PLY: Brand: CURITY

## (undated) DEVICE — GLOVE SRG BIOGEL 7.5

## (undated) DEVICE — DRESSING XEROFORM 5 X 9